# Patient Record
Sex: MALE | Race: OTHER | NOT HISPANIC OR LATINO | URBAN - METROPOLITAN AREA
[De-identification: names, ages, dates, MRNs, and addresses within clinical notes are randomized per-mention and may not be internally consistent; named-entity substitution may affect disease eponyms.]

---

## 2023-10-16 ENCOUNTER — INPATIENT (INPATIENT)
Facility: HOSPITAL | Age: 28
LOS: 3 days | Discharge: LEFT BEFORE DISCHARGE | End: 2023-10-20
Attending: STUDENT IN AN ORGANIZED HEALTH CARE EDUCATION/TRAINING PROGRAM | Admitting: STUDENT IN AN ORGANIZED HEALTH CARE EDUCATION/TRAINING PROGRAM
Payer: MEDICAID

## 2023-10-16 VITALS
TEMPERATURE: 98 F | RESPIRATION RATE: 18 BRPM | SYSTOLIC BLOOD PRESSURE: 146 MMHG | HEART RATE: 136 BPM | DIASTOLIC BLOOD PRESSURE: 79 MMHG | OXYGEN SATURATION: 100 %

## 2023-10-16 DIAGNOSIS — J98.59 OTHER DISEASES OF MEDIASTINUM, NOT ELSEWHERE CLASSIFIED: ICD-10-CM

## 2023-10-16 LAB
ALBUMIN SERPL ELPH-MCNC: 4.3 G/DL — SIGNIFICANT CHANGE UP (ref 3.3–5)
ALBUMIN SERPL ELPH-MCNC: 4.3 G/DL — SIGNIFICANT CHANGE UP (ref 3.3–5)
ALBUMIN SERPL ELPH-MCNC: 4.4 G/DL — SIGNIFICANT CHANGE UP (ref 3.3–5)
ALBUMIN SERPL ELPH-MCNC: 4.4 G/DL — SIGNIFICANT CHANGE UP (ref 3.3–5)
ALP SERPL-CCNC: 155 U/L — HIGH (ref 40–120)
ALP SERPL-CCNC: 155 U/L — HIGH (ref 40–120)
ALP SERPL-CCNC: 175 U/L — HIGH (ref 40–120)
ALP SERPL-CCNC: 175 U/L — HIGH (ref 40–120)
ALT FLD-CCNC: 13 U/L — SIGNIFICANT CHANGE UP (ref 4–41)
ALT FLD-CCNC: 13 U/L — SIGNIFICANT CHANGE UP (ref 4–41)
ALT FLD-CCNC: 17 U/L — SIGNIFICANT CHANGE UP (ref 4–41)
ALT FLD-CCNC: 17 U/L — SIGNIFICANT CHANGE UP (ref 4–41)
ANION GAP SERPL CALC-SCNC: 12 MMOL/L — SIGNIFICANT CHANGE UP (ref 7–14)
AST SERPL-CCNC: 28 U/L — SIGNIFICANT CHANGE UP (ref 4–40)
AST SERPL-CCNC: 28 U/L — SIGNIFICANT CHANGE UP (ref 4–40)
AST SERPL-CCNC: 8 U/L — SIGNIFICANT CHANGE UP (ref 4–40)
AST SERPL-CCNC: 8 U/L — SIGNIFICANT CHANGE UP (ref 4–40)
BASE EXCESS BLDV CALC-SCNC: 1.1 MMOL/L — SIGNIFICANT CHANGE UP (ref -2–3)
BASE EXCESS BLDV CALC-SCNC: 1.1 MMOL/L — SIGNIFICANT CHANGE UP (ref -2–3)
BASOPHILS # BLD AUTO: 0.14 K/UL — SIGNIFICANT CHANGE UP (ref 0–0.2)
BASOPHILS # BLD AUTO: 0.14 K/UL — SIGNIFICANT CHANGE UP (ref 0–0.2)
BASOPHILS NFR BLD AUTO: 0.8 % — SIGNIFICANT CHANGE UP (ref 0–2)
BASOPHILS NFR BLD AUTO: 0.8 % — SIGNIFICANT CHANGE UP (ref 0–2)
BILIRUB SERPL-MCNC: 0.3 MG/DL — SIGNIFICANT CHANGE UP (ref 0.2–1.2)
BLOOD GAS VENOUS COMPREHENSIVE RESULT: SIGNIFICANT CHANGE UP
BLOOD GAS VENOUS COMPREHENSIVE RESULT: SIGNIFICANT CHANGE UP
BUN SERPL-MCNC: 8 MG/DL — SIGNIFICANT CHANGE UP (ref 7–23)
CA-I BLD-SCNC: 1.21 MMOL/L — SIGNIFICANT CHANGE UP (ref 1.15–1.29)
CA-I BLD-SCNC: 1.21 MMOL/L — SIGNIFICANT CHANGE UP (ref 1.15–1.29)
CALCIUM SERPL-MCNC: 10.1 MG/DL — SIGNIFICANT CHANGE UP (ref 8.4–10.5)
CHLORIDE BLDV-SCNC: 102 MMOL/L — SIGNIFICANT CHANGE UP (ref 96–108)
CHLORIDE BLDV-SCNC: 102 MMOL/L — SIGNIFICANT CHANGE UP (ref 96–108)
CHLORIDE SERPL-SCNC: 101 MMOL/L — SIGNIFICANT CHANGE UP (ref 98–107)
CHLORIDE SERPL-SCNC: 101 MMOL/L — SIGNIFICANT CHANGE UP (ref 98–107)
CHLORIDE SERPL-SCNC: 102 MMOL/L — SIGNIFICANT CHANGE UP (ref 98–107)
CHLORIDE SERPL-SCNC: 102 MMOL/L — SIGNIFICANT CHANGE UP (ref 98–107)
CO2 BLDV-SCNC: 28 MMOL/L — HIGH (ref 22–26)
CO2 BLDV-SCNC: 28 MMOL/L — HIGH (ref 22–26)
CO2 SERPL-SCNC: 23 MMOL/L — SIGNIFICANT CHANGE UP (ref 22–31)
CO2 SERPL-SCNC: 23 MMOL/L — SIGNIFICANT CHANGE UP (ref 22–31)
CO2 SERPL-SCNC: 25 MMOL/L — SIGNIFICANT CHANGE UP (ref 22–31)
CO2 SERPL-SCNC: 25 MMOL/L — SIGNIFICANT CHANGE UP (ref 22–31)
CREAT SERPL-MCNC: 0.83 MG/DL — SIGNIFICANT CHANGE UP (ref 0.5–1.3)
CREAT SERPL-MCNC: 0.83 MG/DL — SIGNIFICANT CHANGE UP (ref 0.5–1.3)
CREAT SERPL-MCNC: 0.94 MG/DL — SIGNIFICANT CHANGE UP (ref 0.5–1.3)
CREAT SERPL-MCNC: 0.94 MG/DL — SIGNIFICANT CHANGE UP (ref 0.5–1.3)
D DIMER BLD IA.RAPID-MCNC: 270 NG/ML DDU — HIGH
D DIMER BLD IA.RAPID-MCNC: 270 NG/ML DDU — HIGH
EGFR: 115 ML/MIN/1.73M2 — SIGNIFICANT CHANGE UP
EGFR: 115 ML/MIN/1.73M2 — SIGNIFICANT CHANGE UP
EGFR: 124 ML/MIN/1.73M2 — SIGNIFICANT CHANGE UP
EGFR: 124 ML/MIN/1.73M2 — SIGNIFICANT CHANGE UP
EOSINOPHIL # BLD AUTO: 0.51 K/UL — HIGH (ref 0–0.5)
EOSINOPHIL # BLD AUTO: 0.51 K/UL — HIGH (ref 0–0.5)
EOSINOPHIL NFR BLD AUTO: 2.8 % — SIGNIFICANT CHANGE UP (ref 0–6)
EOSINOPHIL NFR BLD AUTO: 2.8 % — SIGNIFICANT CHANGE UP (ref 0–6)
FIBRINOGEN PPP-MCNC: 505 MG/DL — HIGH (ref 200–465)
FIBRINOGEN PPP-MCNC: 505 MG/DL — HIGH (ref 200–465)
GAS PNL BLDV: 135 MMOL/L — LOW (ref 136–145)
GAS PNL BLDV: 135 MMOL/L — LOW (ref 136–145)
GLUCOSE BLDV-MCNC: 97 MG/DL — SIGNIFICANT CHANGE UP (ref 70–99)
GLUCOSE BLDV-MCNC: 97 MG/DL — SIGNIFICANT CHANGE UP (ref 70–99)
GLUCOSE SERPL-MCNC: 77 MG/DL — SIGNIFICANT CHANGE UP (ref 70–99)
GLUCOSE SERPL-MCNC: 77 MG/DL — SIGNIFICANT CHANGE UP (ref 70–99)
GLUCOSE SERPL-MCNC: 93 MG/DL — SIGNIFICANT CHANGE UP (ref 70–99)
GLUCOSE SERPL-MCNC: 93 MG/DL — SIGNIFICANT CHANGE UP (ref 70–99)
HCG SERPL-ACNC: <1 MIU/ML — SIGNIFICANT CHANGE UP
HCG SERPL-ACNC: <1 MIU/ML — SIGNIFICANT CHANGE UP
HCO3 BLDV-SCNC: 27 MMOL/L — SIGNIFICANT CHANGE UP (ref 22–29)
HCO3 BLDV-SCNC: 27 MMOL/L — SIGNIFICANT CHANGE UP (ref 22–29)
HCT VFR BLD CALC: 41.1 % — SIGNIFICANT CHANGE UP (ref 39–50)
HCT VFR BLD CALC: 41.1 % — SIGNIFICANT CHANGE UP (ref 39–50)
HCT VFR BLDA CALC: 38 % — LOW (ref 39–51)
HCT VFR BLDA CALC: 38 % — LOW (ref 39–51)
HGB BLD CALC-MCNC: 12.8 G/DL — SIGNIFICANT CHANGE UP (ref 12.6–17.4)
HGB BLD CALC-MCNC: 12.8 G/DL — SIGNIFICANT CHANGE UP (ref 12.6–17.4)
HGB BLD-MCNC: 13.6 G/DL — SIGNIFICANT CHANGE UP (ref 13–17)
HGB BLD-MCNC: 13.6 G/DL — SIGNIFICANT CHANGE UP (ref 13–17)
IANC: 14.18 K/UL — HIGH (ref 1.8–7.4)
IANC: 14.18 K/UL — HIGH (ref 1.8–7.4)
IMM GRANULOCYTES NFR BLD AUTO: 0.5 % — SIGNIFICANT CHANGE UP (ref 0–0.9)
IMM GRANULOCYTES NFR BLD AUTO: 0.5 % — SIGNIFICANT CHANGE UP (ref 0–0.9)
INR BLD: 1.1 RATIO — SIGNIFICANT CHANGE UP (ref 0.85–1.18)
INR BLD: 1.1 RATIO — SIGNIFICANT CHANGE UP (ref 0.85–1.18)
LACTATE BLDV-MCNC: 1.1 MMOL/L — SIGNIFICANT CHANGE UP (ref 0.5–2)
LACTATE BLDV-MCNC: 1.1 MMOL/L — SIGNIFICANT CHANGE UP (ref 0.5–2)
LDH SERPL L TO P-CCNC: 119 U/L — LOW (ref 135–225)
LDH SERPL L TO P-CCNC: 119 U/L — LOW (ref 135–225)
LDH SERPL L TO P-CCNC: 505 U/L — HIGH (ref 135–225)
LDH SERPL L TO P-CCNC: 505 U/L — HIGH (ref 135–225)
LYMPHOCYTES # BLD AUTO: 12 % — LOW (ref 13–44)
LYMPHOCYTES # BLD AUTO: 12 % — LOW (ref 13–44)
LYMPHOCYTES # BLD AUTO: 2.21 K/UL — SIGNIFICANT CHANGE UP (ref 1–3.3)
LYMPHOCYTES # BLD AUTO: 2.21 K/UL — SIGNIFICANT CHANGE UP (ref 1–3.3)
MAGNESIUM SERPL-MCNC: 2.2 MG/DL — SIGNIFICANT CHANGE UP (ref 1.6–2.6)
MAGNESIUM SERPL-MCNC: 2.2 MG/DL — SIGNIFICANT CHANGE UP (ref 1.6–2.6)
MAGNESIUM SERPL-MCNC: 2.4 MG/DL — SIGNIFICANT CHANGE UP (ref 1.6–2.6)
MAGNESIUM SERPL-MCNC: 2.4 MG/DL — SIGNIFICANT CHANGE UP (ref 1.6–2.6)
MCHC RBC-ENTMCNC: 26.5 PG — LOW (ref 27–34)
MCHC RBC-ENTMCNC: 26.5 PG — LOW (ref 27–34)
MCHC RBC-ENTMCNC: 33.1 GM/DL — SIGNIFICANT CHANGE UP (ref 32–36)
MCHC RBC-ENTMCNC: 33.1 GM/DL — SIGNIFICANT CHANGE UP (ref 32–36)
MCV RBC AUTO: 80.1 FL — SIGNIFICANT CHANGE UP (ref 80–100)
MCV RBC AUTO: 80.1 FL — SIGNIFICANT CHANGE UP (ref 80–100)
MONOCYTES # BLD AUTO: 1.25 K/UL — HIGH (ref 0–0.9)
MONOCYTES # BLD AUTO: 1.25 K/UL — HIGH (ref 0–0.9)
MONOCYTES NFR BLD AUTO: 6.8 % — SIGNIFICANT CHANGE UP (ref 2–14)
MONOCYTES NFR BLD AUTO: 6.8 % — SIGNIFICANT CHANGE UP (ref 2–14)
NEUTROPHILS # BLD AUTO: 14.18 K/UL — HIGH (ref 1.8–7.4)
NEUTROPHILS # BLD AUTO: 14.18 K/UL — HIGH (ref 1.8–7.4)
NEUTROPHILS NFR BLD AUTO: 77.1 % — HIGH (ref 43–77)
NEUTROPHILS NFR BLD AUTO: 77.1 % — HIGH (ref 43–77)
NRBC # BLD: 0 /100 WBCS — SIGNIFICANT CHANGE UP (ref 0–0)
NRBC # BLD: 0 /100 WBCS — SIGNIFICANT CHANGE UP (ref 0–0)
NRBC # FLD: 0 K/UL — SIGNIFICANT CHANGE UP (ref 0–0)
NRBC # FLD: 0 K/UL — SIGNIFICANT CHANGE UP (ref 0–0)
PCO2 BLDV: 45 MMHG — SIGNIFICANT CHANGE UP (ref 42–55)
PCO2 BLDV: 45 MMHG — SIGNIFICANT CHANGE UP (ref 42–55)
PH BLDV: 7.38 — SIGNIFICANT CHANGE UP (ref 7.32–7.43)
PH BLDV: 7.38 — SIGNIFICANT CHANGE UP (ref 7.32–7.43)
PHOSPHATE SERPL-MCNC: 3.5 MG/DL — SIGNIFICANT CHANGE UP (ref 2.5–4.5)
PHOSPHATE SERPL-MCNC: 3.5 MG/DL — SIGNIFICANT CHANGE UP (ref 2.5–4.5)
PHOSPHATE SERPL-MCNC: 4.4 MG/DL — SIGNIFICANT CHANGE UP (ref 2.5–4.5)
PHOSPHATE SERPL-MCNC: 4.4 MG/DL — SIGNIFICANT CHANGE UP (ref 2.5–4.5)
PLATELET # BLD AUTO: 559 K/UL — HIGH (ref 150–400)
PLATELET # BLD AUTO: 559 K/UL — HIGH (ref 150–400)
PO2 BLDV: 53 MMHG — HIGH (ref 25–45)
PO2 BLDV: 53 MMHG — HIGH (ref 25–45)
POTASSIUM BLDV-SCNC: 3.6 MMOL/L — SIGNIFICANT CHANGE UP (ref 3.5–5.1)
POTASSIUM BLDV-SCNC: 3.6 MMOL/L — SIGNIFICANT CHANGE UP (ref 3.5–5.1)
POTASSIUM SERPL-MCNC: 3.5 MMOL/L — SIGNIFICANT CHANGE UP (ref 3.5–5.3)
POTASSIUM SERPL-MCNC: 3.5 MMOL/L — SIGNIFICANT CHANGE UP (ref 3.5–5.3)
POTASSIUM SERPL-MCNC: 4.7 MMOL/L — SIGNIFICANT CHANGE UP (ref 3.5–5.3)
POTASSIUM SERPL-MCNC: 4.7 MMOL/L — SIGNIFICANT CHANGE UP (ref 3.5–5.3)
POTASSIUM SERPL-SCNC: 3.5 MMOL/L — SIGNIFICANT CHANGE UP (ref 3.5–5.3)
POTASSIUM SERPL-SCNC: 3.5 MMOL/L — SIGNIFICANT CHANGE UP (ref 3.5–5.3)
POTASSIUM SERPL-SCNC: 4.7 MMOL/L — SIGNIFICANT CHANGE UP (ref 3.5–5.3)
POTASSIUM SERPL-SCNC: 4.7 MMOL/L — SIGNIFICANT CHANGE UP (ref 3.5–5.3)
PROT SERPL-MCNC: 8.3 G/DL — SIGNIFICANT CHANGE UP (ref 6–8.3)
PROT SERPL-MCNC: 8.3 G/DL — SIGNIFICANT CHANGE UP (ref 6–8.3)
PROT SERPL-MCNC: 9.2 G/DL — HIGH (ref 6–8.3)
PROT SERPL-MCNC: 9.2 G/DL — HIGH (ref 6–8.3)
PROTHROM AB SERPL-ACNC: 12.3 SEC — SIGNIFICANT CHANGE UP (ref 9.5–13)
PROTHROM AB SERPL-ACNC: 12.3 SEC — SIGNIFICANT CHANGE UP (ref 9.5–13)
RBC # BLD: 5.13 M/UL — SIGNIFICANT CHANGE UP (ref 4.2–5.8)
RBC # BLD: 5.13 M/UL — SIGNIFICANT CHANGE UP (ref 4.2–5.8)
RBC # FLD: 14 % — SIGNIFICANT CHANGE UP (ref 10.3–14.5)
RBC # FLD: 14 % — SIGNIFICANT CHANGE UP (ref 10.3–14.5)
SAO2 % BLDV: 83.7 % — SIGNIFICANT CHANGE UP (ref 67–88)
SAO2 % BLDV: 83.7 % — SIGNIFICANT CHANGE UP (ref 67–88)
SODIUM SERPL-SCNC: 137 MMOL/L — SIGNIFICANT CHANGE UP (ref 135–145)
SODIUM SERPL-SCNC: 137 MMOL/L — SIGNIFICANT CHANGE UP (ref 135–145)
SODIUM SERPL-SCNC: 138 MMOL/L — SIGNIFICANT CHANGE UP (ref 135–145)
SODIUM SERPL-SCNC: 138 MMOL/L — SIGNIFICANT CHANGE UP (ref 135–145)
TSH SERPL-MCNC: 0.93 UIU/ML — SIGNIFICANT CHANGE UP (ref 0.27–4.2)
TSH SERPL-MCNC: 0.93 UIU/ML — SIGNIFICANT CHANGE UP (ref 0.27–4.2)
URATE SERPL-MCNC: 8.9 MG/DL — HIGH (ref 3.4–8.8)
URATE SERPL-MCNC: 8.9 MG/DL — HIGH (ref 3.4–8.8)
URATE SERPL-MCNC: 9 MG/DL — HIGH (ref 3.4–8.8)
URATE SERPL-MCNC: 9 MG/DL — HIGH (ref 3.4–8.8)
WBC # BLD: 18.38 K/UL — HIGH (ref 3.8–10.5)
WBC # BLD: 18.38 K/UL — HIGH (ref 3.8–10.5)
WBC # FLD AUTO: 18.38 K/UL — HIGH (ref 3.8–10.5)
WBC # FLD AUTO: 18.38 K/UL — HIGH (ref 3.8–10.5)

## 2023-10-16 PROCEDURE — 74177 CT ABD & PELVIS W/CONTRAST: CPT | Mod: 26,MA

## 2023-10-16 PROCEDURE — 88189 FLOWCYTOMETRY/READ 16 & >: CPT

## 2023-10-16 PROCEDURE — 71275 CT ANGIOGRAPHY CHEST: CPT | Mod: 26,MA

## 2023-10-16 PROCEDURE — 99285 EMERGENCY DEPT VISIT HI MDM: CPT

## 2023-10-16 RX ORDER — ALLOPURINOL 300 MG
300 TABLET ORAL DAILY
Refills: 0 | Status: DISCONTINUED | OUTPATIENT
Start: 2023-10-16 | End: 2023-10-20

## 2023-10-16 RX ORDER — SODIUM CHLORIDE 9 MG/ML
1000 INJECTION INTRAMUSCULAR; INTRAVENOUS; SUBCUTANEOUS ONCE
Refills: 0 | Status: COMPLETED | OUTPATIENT
Start: 2023-10-16 | End: 2023-10-16

## 2023-10-16 RX ORDER — SODIUM CHLORIDE 9 MG/ML
1000 INJECTION, SOLUTION INTRAVENOUS
Refills: 0 | Status: DISCONTINUED | OUTPATIENT
Start: 2023-10-16 | End: 2023-10-17

## 2023-10-16 RX ADMIN — SODIUM CHLORIDE 1000 MILLILITER(S): 9 INJECTION INTRAMUSCULAR; INTRAVENOUS; SUBCUTANEOUS at 23:57

## 2023-10-16 NOTE — ED ADULT TRIAGE NOTE - CHIEF COMPLAINT QUOTE
patient found chest mass in sept, sent for biopsy. Pt unsure of results but states "md looking for lymphoma". Pt was scheduled for PETSCAN in 2 weeks but is having chest pain and was told to come to ED. Pt very hestitant in triage, poor historian. States " feels overwhelmed with news.

## 2023-10-16 NOTE — ED PROVIDER NOTE - PROGRESS NOTE DETAILS
ALLY BLACKWOOD: Onc consulted. CT w/ bulky mediastinal lymphadenopathy, consistent with neoplasm. Pt accepted by hospitalist.

## 2023-10-16 NOTE — ED PROVIDER NOTE - NS ED ATTENDING STATEMENT MOD
This was a shared visit with the BILLIE. I reviewed and verified the documentation and independently performed the documented:

## 2023-10-16 NOTE — ED PROVIDER NOTE - CLINICAL SUMMARY MEDICAL DECISION MAKING FREE TEXT BOX
27 yo M with no significant PMH, recent finding of mediastinal mass w/ biopsy 10/4/23 at [facility] w/ inconclusive result, sent by PCP to ED c/o chest pain x3 weeks, mediastinal mass re-biopsy. well appearing male. EKG w/ sinus tach, currently no chest pain or sob, not in acute respiratory distress. Pt recently had workup for mediastinal mass w/ labs and CT chest/abd/pelvis, chest mass biopsy 10/4 w/ inconclusive result, pending another biopsy this Thursday, pending PET scan in 2 wks. Plan: labs, CT chest/abd/pelvis, oncology consult.

## 2023-10-16 NOTE — ED PROVIDER NOTE - OBJECTIVE STATEMENT
27 yo M with no significant PMH, recent finding of mediastinal mass w/ biopsy 10/4/23 at [facility] w/ inconclusive result, sent by PCP to ED c/o chest pain x3 weeks, mediastinal mass re-biopsy. 27 yo M with no significant PMH, recent finding of mediastinal mass w/ biopsy 10/4/23 at [facility] w/ inconclusive result, sent by PCP to ED c/o chest pain x3 weeks, mediastinal mass re-biopsy. States having left side sharp pain, intermittent, lasting for seconds, worse with coughing and movements. Reports seen and evaluated at [facility] w/ CT scan and had biopsy which need more sample. Admits PCP felt the place is taking too long, therefore, advised to go [facility] for evaluation. Pt denies any associated symptoms, including f/c/n/v, near syncope, palpitation, diaphoresis, cough, sob, weakness, any recent trauma or injury to chest, lower extremity edema. Denies any prior history of DVT/PE, hx of malignancy or known hypercoagulable state. Denies any early family history of cardiac death or MI.

## 2023-10-16 NOTE — ED PROVIDER NOTE - ATTENDING APP SHARED VISIT CONTRIBUTION OF CARE
Patient well-appearing no acute distress HEENT normal no cervical adenopathy no jaundice, heart sounds normal lungs clear no chest wall tenderness.  Abdomen soft nontender no hepatosplenomegaly.  Extremities no edema skin no rashes.  Reviewed old biopsy results  from patients phone done at [facility], and laboratory data significant for elevated white count of over 20,000 elevated alk phos but normal LDH.

## 2023-10-16 NOTE — ED ADULT NURSE NOTE - OBJECTIVE STATEMENT
pt received intake #10c, sent by his PMD for a pet scan and oncology follow up. pt had Biopsy at [facility] that was concerning for possible cancer. c/o shoulder pain x 2 weeks. blood work obtained and sent. pt aox4, rr even and unlabored. family at bedside. pending CT scan. pt to RW.

## 2023-10-16 NOTE — CHART NOTE - NSCHARTNOTEFT_GEN_A_CORE
Hematology called for patient Felipe Gaitan. He had the following labs at [facility] (10/4/23): WBC 26.7, Hgb 12.7, MCV 83, Total Protein 9.4, ALK Phos 192. Patient had a mediastinal lymph node biopsy that showed a few large atypical cells, many foamy cells, no Tonio-Daniel cells. More tissue is needed for further characterization.     Please obtain:  - CBC w/ diff daily, TLS labs (uric acid, LDH, Phos, CMP)  - CT c/a/p w/ IV contrast  - If there is a lymph node that can be biopsied, recommend core biopsy with IR or EBUS depending on location of lymph node  - Will send peripheral flow cytometry  - Will review peripheral smear  - Infectious work up per primary team and ID  - Workup for other types of mediastinal masses: AFB, HCG, TSH  - Hematology will perform a full consult in the morning    ***************************************************************  Linda Lorenzana, PGY4  Fellow Hematology/Oncology  pager: [phone]   Available on Microsoft Teams  After 5pm or on weekends please contact  to page on-call fellow   *************************************************************** Hematology called for patient Felipe Gaitan. He had the following labs at [facility] (10/4/23): WBC 26.7, Hgb 12.7, MCV 83, Total Protein 9.4, ALK Phos 192. Patient had a mediastinal lymph node biopsy that showed a few large atypical cells, many foamy cells, no Tonio-Daniel cells. More tissue is needed for further characterization.     Please obtain:  - CBC w/ diff daily, TLS labs (uric acid, LDH, Phos, CMP)  - Continue supportive transfusions as needed to maintain Hgb > 7 and plt > 10.   - Trend DIC labs (PT, INR, PTT, D-dimer) daily  - Trend TLS labs (CMP, Phos, uric acid, LDH) daily  - Continue IV hydration at 100 cc/hour  - Start allopurinol 300 mg daily.  Consider rasburicase if uric acid > 8. Please check G6PD level prior to giving rasburicase  - CT c/a/p w/ IV contrast  - If there is a lymph node that can be biopsied, recommend core biopsy with IR or EBUS depending on location of lymph node  - Will send peripheral flow cytometry  - Will review peripheral smear  - Infectious work up per primary team and ID  - Workup for other types of mediastinal masses: AFB, HCG, TSH  - Hematology will perform a full consult in the morning    ***************************************************************  Linda Lorenzana, PGY4  Fellow Hematology/Oncology  pager: [phone]   Available on Microsoft Teams  After 5pm or on weekends please contact  to page on-call fellow   ***************************************************************

## 2023-10-17 DIAGNOSIS — R01.1 CARDIAC MURMUR, UNSPECIFIED: ICD-10-CM

## 2023-10-17 DIAGNOSIS — D72.829 ELEVATED WHITE BLOOD CELL COUNT, UNSPECIFIED: ICD-10-CM

## 2023-10-17 DIAGNOSIS — R59.0 LOCALIZED ENLARGED LYMPH NODES: ICD-10-CM

## 2023-10-17 DIAGNOSIS — E88.3 TUMOR LYSIS SYNDROME: ICD-10-CM

## 2023-10-17 DIAGNOSIS — Z29.9 ENCOUNTER FOR PROPHYLACTIC MEASURES, UNSPECIFIED: ICD-10-CM

## 2023-10-17 LAB
AFP-TM SERPL-MCNC: <1.8 NG/ML — SIGNIFICANT CHANGE UP
AFP-TM SERPL-MCNC: <1.8 NG/ML — SIGNIFICANT CHANGE UP
ALBUMIN SERPL ELPH-MCNC: 3.9 G/DL — SIGNIFICANT CHANGE UP (ref 3.3–5)
ALBUMIN SERPL ELPH-MCNC: 3.9 G/DL — SIGNIFICANT CHANGE UP (ref 3.3–5)
ALP SERPL-CCNC: 143 U/L — HIGH (ref 40–120)
ALP SERPL-CCNC: 143 U/L — HIGH (ref 40–120)
ALT FLD-CCNC: 12 U/L — SIGNIFICANT CHANGE UP (ref 4–41)
ALT FLD-CCNC: 12 U/L — SIGNIFICANT CHANGE UP (ref 4–41)
ANION GAP SERPL CALC-SCNC: 12 MMOL/L — SIGNIFICANT CHANGE UP (ref 7–14)
ANION GAP SERPL CALC-SCNC: 12 MMOL/L — SIGNIFICANT CHANGE UP (ref 7–14)
APTT BLD: 32.1 SEC — SIGNIFICANT CHANGE UP (ref 24.5–35.6)
APTT BLD: 32.1 SEC — SIGNIFICANT CHANGE UP (ref 24.5–35.6)
AST SERPL-CCNC: 7 U/L — SIGNIFICANT CHANGE UP (ref 4–40)
AST SERPL-CCNC: 7 U/L — SIGNIFICANT CHANGE UP (ref 4–40)
BASOPHILS # BLD AUTO: 0.11 K/UL — SIGNIFICANT CHANGE UP (ref 0–0.2)
BASOPHILS # BLD AUTO: 0.11 K/UL — SIGNIFICANT CHANGE UP (ref 0–0.2)
BASOPHILS NFR BLD AUTO: 0.6 % — SIGNIFICANT CHANGE UP (ref 0–2)
BASOPHILS NFR BLD AUTO: 0.6 % — SIGNIFICANT CHANGE UP (ref 0–2)
BILIRUB SERPL-MCNC: 0.5 MG/DL — SIGNIFICANT CHANGE UP (ref 0.2–1.2)
BILIRUB SERPL-MCNC: 0.5 MG/DL — SIGNIFICANT CHANGE UP (ref 0.2–1.2)
BUN SERPL-MCNC: 7 MG/DL — SIGNIFICANT CHANGE UP (ref 7–23)
BUN SERPL-MCNC: 7 MG/DL — SIGNIFICANT CHANGE UP (ref 7–23)
CALCIUM SERPL-MCNC: 9.3 MG/DL — SIGNIFICANT CHANGE UP (ref 8.4–10.5)
CALCIUM SERPL-MCNC: 9.3 MG/DL — SIGNIFICANT CHANGE UP (ref 8.4–10.5)
CHLORIDE SERPL-SCNC: 105 MMOL/L — SIGNIFICANT CHANGE UP (ref 98–107)
CHLORIDE SERPL-SCNC: 105 MMOL/L — SIGNIFICANT CHANGE UP (ref 98–107)
CO2 SERPL-SCNC: 23 MMOL/L — SIGNIFICANT CHANGE UP (ref 22–31)
CO2 SERPL-SCNC: 23 MMOL/L — SIGNIFICANT CHANGE UP (ref 22–31)
CREAT SERPL-MCNC: 0.87 MG/DL — SIGNIFICANT CHANGE UP (ref 0.5–1.3)
CREAT SERPL-MCNC: 0.87 MG/DL — SIGNIFICANT CHANGE UP (ref 0.5–1.3)
D DIMER BLD IA.RAPID-MCNC: 296 NG/ML DDU — HIGH
D DIMER BLD IA.RAPID-MCNC: 296 NG/ML DDU — HIGH
EGFR: 122 ML/MIN/1.73M2 — SIGNIFICANT CHANGE UP
EGFR: 122 ML/MIN/1.73M2 — SIGNIFICANT CHANGE UP
EOSINOPHIL # BLD AUTO: 0.59 K/UL — HIGH (ref 0–0.5)
EOSINOPHIL # BLD AUTO: 0.59 K/UL — HIGH (ref 0–0.5)
EOSINOPHIL NFR BLD AUTO: 3.2 % — SIGNIFICANT CHANGE UP (ref 0–6)
EOSINOPHIL NFR BLD AUTO: 3.2 % — SIGNIFICANT CHANGE UP (ref 0–6)
GLUCOSE SERPL-MCNC: 100 MG/DL — HIGH (ref 70–99)
GLUCOSE SERPL-MCNC: 100 MG/DL — HIGH (ref 70–99)
HCG-TM SERPL-ACNC: <1 MIU/ML — SIGNIFICANT CHANGE UP
HCG-TM SERPL-ACNC: <1 MIU/ML — SIGNIFICANT CHANGE UP
HCT VFR BLD CALC: 36.5 % — LOW (ref 39–50)
HCT VFR BLD CALC: 36.5 % — LOW (ref 39–50)
HGB BLD-MCNC: 11.7 G/DL — LOW (ref 13–17)
HGB BLD-MCNC: 11.7 G/DL — LOW (ref 13–17)
IANC: 14.3 K/UL — HIGH (ref 1.8–7.4)
IANC: 14.3 K/UL — HIGH (ref 1.8–7.4)
IMM GRANULOCYTES NFR BLD AUTO: 0.5 % — SIGNIFICANT CHANGE UP (ref 0–0.9)
IMM GRANULOCYTES NFR BLD AUTO: 0.5 % — SIGNIFICANT CHANGE UP (ref 0–0.9)
INR BLD: 1.15 RATIO — SIGNIFICANT CHANGE UP (ref 0.85–1.18)
INR BLD: 1.15 RATIO — SIGNIFICANT CHANGE UP (ref 0.85–1.18)
LDH SERPL L TO P-CCNC: 111 U/L — LOW (ref 135–225)
LDH SERPL L TO P-CCNC: 111 U/L — LOW (ref 135–225)
LYMPHOCYTES # BLD AUTO: 11.5 % — LOW (ref 13–44)
LYMPHOCYTES # BLD AUTO: 11.5 % — LOW (ref 13–44)
LYMPHOCYTES # BLD AUTO: 2.1 K/UL — SIGNIFICANT CHANGE UP (ref 1–3.3)
LYMPHOCYTES # BLD AUTO: 2.1 K/UL — SIGNIFICANT CHANGE UP (ref 1–3.3)
MAGNESIUM SERPL-MCNC: 2.2 MG/DL — SIGNIFICANT CHANGE UP (ref 1.6–2.6)
MAGNESIUM SERPL-MCNC: 2.2 MG/DL — SIGNIFICANT CHANGE UP (ref 1.6–2.6)
MCHC RBC-ENTMCNC: 26.3 PG — LOW (ref 27–34)
MCHC RBC-ENTMCNC: 26.3 PG — LOW (ref 27–34)
MCHC RBC-ENTMCNC: 32.1 GM/DL — SIGNIFICANT CHANGE UP (ref 32–36)
MCHC RBC-ENTMCNC: 32.1 GM/DL — SIGNIFICANT CHANGE UP (ref 32–36)
MCV RBC AUTO: 82 FL — SIGNIFICANT CHANGE UP (ref 80–100)
MCV RBC AUTO: 82 FL — SIGNIFICANT CHANGE UP (ref 80–100)
MONOCYTES # BLD AUTO: 1 K/UL — HIGH (ref 0–0.9)
MONOCYTES # BLD AUTO: 1 K/UL — HIGH (ref 0–0.9)
MONOCYTES NFR BLD AUTO: 5.5 % — SIGNIFICANT CHANGE UP (ref 2–14)
MONOCYTES NFR BLD AUTO: 5.5 % — SIGNIFICANT CHANGE UP (ref 2–14)
NEUTROPHILS # BLD AUTO: 14.3 K/UL — HIGH (ref 1.8–7.4)
NEUTROPHILS # BLD AUTO: 14.3 K/UL — HIGH (ref 1.8–7.4)
NEUTROPHILS NFR BLD AUTO: 78.7 % — HIGH (ref 43–77)
NEUTROPHILS NFR BLD AUTO: 78.7 % — HIGH (ref 43–77)
NRBC # BLD: 0 /100 WBCS — SIGNIFICANT CHANGE UP (ref 0–0)
NRBC # BLD: 0 /100 WBCS — SIGNIFICANT CHANGE UP (ref 0–0)
NRBC # FLD: 0 K/UL — SIGNIFICANT CHANGE UP (ref 0–0)
NRBC # FLD: 0 K/UL — SIGNIFICANT CHANGE UP (ref 0–0)
PHOSPHATE SERPL-MCNC: 4.2 MG/DL — SIGNIFICANT CHANGE UP (ref 2.5–4.5)
PHOSPHATE SERPL-MCNC: 4.2 MG/DL — SIGNIFICANT CHANGE UP (ref 2.5–4.5)
PLATELET # BLD AUTO: 417 K/UL — HIGH (ref 150–400)
PLATELET # BLD AUTO: 417 K/UL — HIGH (ref 150–400)
POTASSIUM SERPL-MCNC: 3.8 MMOL/L — SIGNIFICANT CHANGE UP (ref 3.5–5.3)
POTASSIUM SERPL-MCNC: 3.8 MMOL/L — SIGNIFICANT CHANGE UP (ref 3.5–5.3)
POTASSIUM SERPL-SCNC: 3.8 MMOL/L — SIGNIFICANT CHANGE UP (ref 3.5–5.3)
POTASSIUM SERPL-SCNC: 3.8 MMOL/L — SIGNIFICANT CHANGE UP (ref 3.5–5.3)
PROT SERPL-MCNC: 8.2 G/DL — SIGNIFICANT CHANGE UP (ref 6–8.3)
PROT SERPL-MCNC: 8.2 G/DL — SIGNIFICANT CHANGE UP (ref 6–8.3)
PROTHROM AB SERPL-ACNC: 12.9 SEC — SIGNIFICANT CHANGE UP (ref 9.5–13)
PROTHROM AB SERPL-ACNC: 12.9 SEC — SIGNIFICANT CHANGE UP (ref 9.5–13)
RBC # BLD: 4.45 M/UL — SIGNIFICANT CHANGE UP (ref 4.2–5.8)
RBC # BLD: 4.45 M/UL — SIGNIFICANT CHANGE UP (ref 4.2–5.8)
RBC # FLD: 14.4 % — SIGNIFICANT CHANGE UP (ref 10.3–14.5)
RBC # FLD: 14.4 % — SIGNIFICANT CHANGE UP (ref 10.3–14.5)
SODIUM SERPL-SCNC: 140 MMOL/L — SIGNIFICANT CHANGE UP (ref 135–145)
SODIUM SERPL-SCNC: 140 MMOL/L — SIGNIFICANT CHANGE UP (ref 135–145)
TSH SERPL-MCNC: 0.68 UIU/ML — SIGNIFICANT CHANGE UP (ref 0.27–4.2)
TSH SERPL-MCNC: 0.68 UIU/ML — SIGNIFICANT CHANGE UP (ref 0.27–4.2)
URATE SERPL-MCNC: 8.6 MG/DL — SIGNIFICANT CHANGE UP (ref 3.4–8.8)
URATE SERPL-MCNC: 8.6 MG/DL — SIGNIFICANT CHANGE UP (ref 3.4–8.8)
WBC # BLD: 18.2 K/UL — HIGH (ref 3.8–10.5)
WBC # BLD: 18.2 K/UL — HIGH (ref 3.8–10.5)
WBC # FLD AUTO: 18.2 K/UL — HIGH (ref 3.8–10.5)
WBC # FLD AUTO: 18.2 K/UL — HIGH (ref 3.8–10.5)

## 2023-10-17 PROCEDURE — 99223 1ST HOSP IP/OBS HIGH 75: CPT

## 2023-10-17 PROCEDURE — 99222 1ST HOSP IP/OBS MODERATE 55: CPT | Mod: GC

## 2023-10-17 PROCEDURE — 99222 1ST HOSP IP/OBS MODERATE 55: CPT

## 2023-10-17 RX ORDER — ENOXAPARIN SODIUM 100 MG/ML
40 INJECTION SUBCUTANEOUS EVERY 24 HOURS
Refills: 0 | Status: DISCONTINUED | OUTPATIENT
Start: 2023-10-17 | End: 2023-10-17

## 2023-10-17 RX ORDER — ACETAMINOPHEN 500 MG
650 TABLET ORAL EVERY 6 HOURS
Refills: 0 | Status: DISCONTINUED | OUTPATIENT
Start: 2023-10-17 | End: 2023-10-20

## 2023-10-17 RX ORDER — SODIUM CHLORIDE 9 MG/ML
1000 INJECTION, SOLUTION INTRAVENOUS
Refills: 0 | Status: DISCONTINUED | OUTPATIENT
Start: 2023-10-17 | End: 2023-10-20

## 2023-10-17 RX ORDER — LANOLIN ALCOHOL/MO/W.PET/CERES
3 CREAM (GRAM) TOPICAL AT BEDTIME
Refills: 0 | Status: DISCONTINUED | OUTPATIENT
Start: 2023-10-17 | End: 2023-10-20

## 2023-10-17 RX ORDER — ALLOPURINOL 300 MG
300 TABLET ORAL DAILY
Refills: 0 | Status: DISCONTINUED | OUTPATIENT
Start: 2023-10-17 | End: 2023-10-17

## 2023-10-17 RX ADMIN — Medication 300 MILLIGRAM(S): at 12:17

## 2023-10-17 RX ADMIN — SODIUM CHLORIDE 100 MILLILITER(S): 9 INJECTION, SOLUTION INTRAVENOUS at 01:38

## 2023-10-17 RX ADMIN — SODIUM CHLORIDE 100 MILLILITER(S): 9 INJECTION, SOLUTION INTRAVENOUS at 12:16

## 2023-10-17 RX ADMIN — SODIUM CHLORIDE 100 MILLILITER(S): 9 INJECTION, SOLUTION INTRAVENOUS at 21:46

## 2023-10-17 NOTE — H&P ADULT - NSHPPHYSICALEXAM_GEN_ALL_CORE
Vital Signs Last 24 Hrs  T(C): 36.7 (17 Oct 2023 00:41), Max: 37.2 (16 Oct 2023 18:14)  T(F): 98 (17 Oct 2023 00:41), Max: 99 (16 Oct 2023 18:14)  HR: 90 (17 Oct 2023 00:41) (90 - 136)  BP: 122/73 (17 Oct 2023 00:41) (118/87 - 146/79)  BP(mean): --  RR: 18 (17 Oct 2023 00:41) (18 - 18)  SpO2: 100% (17 Oct 2023 00:41) (100% - 100%)    Parameters below as of 17 Oct 2023 00:41  Patient On (Oxygen Delivery Method): room air        CONSTITUTIONAL: Well-groomed, in no apparent distress  EYES: No conjunctival or scleral injection, non-icteric; PERRLA and symmetric  ENMT: No external nasal lesions; nasal mucosa not inflamed; normal dentition;  oral mucosa with moist membranes  NECK: Trachea midline without palpable neck mass; thyroid not enlarged and non-tender  RESPIRATORY: Breathing comfortably; lungs CTA without wheeze/rhonchi/rales  CARDIOVASCULAR: +S1S2, RRR, no M/G/R; pedal pulses full and symmetric; no lower extremity edema bilaterally  GASTROINTESTINAL: No palpable masses or tenderness, +BS throughout, no rebound/guarding; no hepatosplenomegaly; no hernia palpated  MUSCULOSKELETAL:  no digital clubbing or cyanosis; no paraspinal tenderness; examination of the  (head/neck, spine/ribs/pelvis, RUE, LUE, RLE, LLE) without misalignment, normal strength and tone of extremities  SKIN: No rashes or ulcers noted; no subcutaneous nodules or induration palpable  NEUROLOGIC: CN II-XII intact; sensation intact in LEs b/l to light touch  PSYCHIATRIC: A+O x 3; mood and affect appropriate; appropriate insight and judgment

## 2023-10-17 NOTE — H&P ADULT - NSHPSOCIALHISTORY_GEN_ALL_CORE
Does huka once a month, denies smoking cigarettes and occasionally drinks alcohol. Lives at home with mother. Works in logistics.

## 2023-10-17 NOTE — H&P ADULT - PROBLEM SELECTOR PLAN 2
-Has had elevated WBC since presentation   -blood culture x2 ordered  -no infectious symptoms currently-of diarrhea, nausea or vomiting, or SOB/cough   -continue to monitor fever/WBC count   -consider ID based on fever/WBC curve.

## 2023-10-17 NOTE — CONSULT NOTE ADULT - SUBJECTIVE AND OBJECTIVE BOX
CHIEF COMPLAINT:Patient is a 28y old  Male who presents with a chief complaint of workup of lymphoma (17 Oct 2023 15:24)      HPI:  Mr. Gaitan is a 28 year old M w/ recent findings of mediastinal mass with biopsy who presents for workup given recent inconclusive biopsy results. He was initially sent by his PCP to ED for chest pain 3 weeks ago, when patient was admitted at [facility] for workup. He describes a left sided sharp chest pain that is intermittent that lasts a few seconds which radiates to the left shoulder and is worse with coughing or movements. At [facility], bulky mediastinal lymphadenopathy was seen and biopsied. Preliminary reports from the mediastinal lymph node biopsy that showed a few large atypical cells, many foamy cells, no Tonio-Daniel cells with a request for more tissue is needed for further characterization. Lab work from [facility] showed the following (10/4/23): WBC 26.7, Hgb 12.7, MCV 83, Total Protein 9.4, ALK Phos 192. CT abdomen and pelvis w/ IV contrast showed bulky mediastinal LAD with a dominant anterior mediastinal conglomerate measuring 6.6x6.2 cm and a right paratracheal lymph node measuring 3.4 cm x2.6 cm. Labs at [facility] were sig for the following: WBC 18.38, , neutrophil 14.18, and neutrophil 77%, lymph 12%, fibrinogen 50, d-dimer 270, alk phos 155, ->505, uric acid 9->8.9, H/H 13.6/41.1, MCV 80 and LA 1.1. CTA chest showed no pulmonary embolism. EKG showed sinus tach, , no st/t changes, qtc of 438 ms. He denies any other symptoms of abdominal pain, change in bowel habits, nausea, vomiting, rashes, itching, fever, or chills. He reports no family history of any lymphoma, leukemia, or any other cancer in the family.  (17 Oct 2023 04:05)  Transthoracic bx performed at [facility], nondiagnostic. Pulm consulted for assistance with diagnosis.       PAST MEDICAL & SURGICAL HISTORY:  No pertinent past medical history      No significant past surgical history          FAMILY HISTORY:  No pertinent family history in first degree relatives        SOCIAL HISTORY:  Smoking: occasional hookah    Allergies    No Known Allergies    Intolerances        HOME MEDICATIONS:  Home Medications:      REVIEW OF SYSTEMS:  Constitutional: [x ] negative [ ] fevers [ ] chills [ ] weight loss [ ] weight gain  HEENT: [ x] negative [ ] dry eyes [ ] eye irritation [ ] postnasal drip [ ] nasal congestion  CV: [ ] negative  [ x] chest pain [ ] orthopnea [ ] palpitations [ ] murmur  Resp: [x ] negative [ ] cough [ ] shortness of breath [ ] dyspnea [ ] wheezing [ ] sputum [ ] hemoptysis  GI: [ x] negative [ ] nausea [ ] vomiting [ ] diarrhea [ ] constipation [ ] abd pain [ ] dysphagia   : [x ] negative [ ] dysuria [ ] nocturia [ ] hematuria [ ] increased urinary frequency  Musculoskeletal: [ ] negative [ ] back pain [ ] myalgias [ ] arthralgias [ ] fracture  Skin: [ ] negative [ ] rash [ ] itch  Neurological: [ ] negative [ ] headache [ ] dizziness [ ] syncope [ ] weakness [ ] numbness  Psychiatric: [ ] negative [ ] anxiety [ ] depression  Endocrine: [ ] negative [ ] diabetes [ ] thyroid problem  Hematologic/Lymphatic: [ ] negative [ ] anemia [ ] bleeding problem  Allergic/Immunologic: [ ] negative [ ] itchy eyes [ ] nasal discharge [ ] hives [ ] angioedema  [x ] All other systems negative  [ ] Unable to assess ROS because ________    OBJECTIVE:  ICU Vital Signs Last 24 Hrs  T(C): 36.7 (17 Oct 2023 05:33), Max: 37.2 (16 Oct 2023 18:14)  T(F): 98.1 (17 Oct 2023 05:33), Max: 99 (16 Oct 2023 18:14)  HR: 85 (17 Oct 2023 05:33) (85 - 101)  BP: 121/74 (17 Oct 2023 05:33) (118/87 - 122/73)  BP(mean): --  ABP: --  ABP(mean): --  RR: 16 (17 Oct 2023 05:33) (16 - 18)  SpO2: 100% (17 Oct 2023 05:33) (100% - 100%)    O2 Parameters below as of 17 Oct 2023 05:33  Patient On (Oxygen Delivery Method): room air              CAPILLARY BLOOD GLUCOSE          PHYSICAL EXAM:  Gen: NAD, WD, WN  HEENT: MMM, PERRL, EOMI  Neck: No JVP elevation  CV: regular  Lung: CTAB  Abd: Soft, NT, ND  Ext: WWP, no CCE  Skin: No rash  Neuro: A&Ox3, no focal deficits    HOSPITAL MEDICATIONS:  Standing Meds:  allopurinol 300 milliGRAM(s) Oral daily  lactated ringers. 1000 milliLiter(s) IV Continuous <Continuous>      PRN Meds:  acetaminophen     Tablet .. 650 milliGRAM(s) Oral every 6 hours PRN  melatonin 3 milliGRAM(s) Oral at bedtime PRN      LABS:    The Labs were reviewed by me   The Radiology was reviewed by me    EKG tracing reviewed by me    10-17    140  |  105  |  7   ----------------------------<  100<H>  3.8   |  23  |  0.87  10-16    138  |  101  |  8   ----------------------------<  93  3.5   |  25  |  0.94  10-16    137  |  102  |  8   ----------------------------<  77  4.7   |  23  |  0.83    Ca    9.3      17 Oct 2023 04:55  Ca    10.1      16 Oct 2023 22:04  Ca    10.1      16 Oct 2023 16:34  Phos  4.2     10-17  Mg     2.20     10-17    TPro  8.2  /  Alb  3.9  /  TBili  0.5  /  DBili  x   /  AST  7   /  ALT  12  /  AlkPhos  143<H>  10-17  TPro  8.3  /  Alb  4.3  /  TBili  0.3  /  DBili  x   /  AST  8   /  ALT  13  /  AlkPhos  155<H>  10-16  TPro  9.2<H>  /  Alb  4.4  /  TBili  0.3  /  DBili  x   /  AST  28  /  ALT  17  /  AlkPhos  175<H>  10-16    Magnesium: 2.20 mg/dL (10-17-23 @ 04:55)  Magnesium: 2.20 mg/dL (10-16-23 @ 22:04)  Magnesium: 2.40 mg/dL (10-16-23 @ 16:34)    Phosphorus: 4.2 mg/dL (10-17-23 @ 04:55)  Phosphorus: 4.4 mg/dL (10-16-23 @ 22:04)  Phosphorus: 3.5 mg/dL (10-16-23 @ 16:34)      PT/INR - ( 17 Oct 2023 04:55 )   PT: 12.9 sec;   INR: 1.15 ratio         PTT - ( 17 Oct 2023 04:55 )  PTT:32.1 sec              Urinalysis Basic - ( 17 Oct 2023 04:55 )    Color: x / Appearance: x / SG: x / pH: x  Gluc: 100 mg/dL / Ketone: x  / Bili: x / Urobili: x   Blood: x / Protein: x / Nitrite: x   Leuk Esterase: x / RBC: x / WBC x   Sq Epi: x / Non Sq Epi: x / Bacteria: x                              11.7   18.20 )-----------( 417      ( 17 Oct 2023 04:55 )             36.5                         13.6   18.38 )-----------( 559      ( 16 Oct 2023 16:34 )             41.1     CAPILLARY BLOOD GLUCOSE            MICROBIOLOGY:       RADIOLOGY:  [ ] Reviewed and interpreted by me    PULMONARY FUNCTION TESTS:    EKG:

## 2023-10-17 NOTE — H&P ADULT - NSHPLABSRESULTS_GEN_ALL_CORE
13.6   18.38 )-----------( 559      ( 16 Oct 2023 16:34 )             41.1     138  |  101  |  8   ----------------------------<  93     10-16  3.5   |  25  |  0.94    Ca    10.1      16 Oct 2023 22:04  Phos  4.4     10-16  Mg     2.20     10-16    TPro  8.3  /  Alb  4.3  /  TBili  0.3  /  DBili  x   /  AST  8   /  ALT  13  /  AlkPhos  155<H>  10-16    PT/INR: 12.3/1.10 (10-16-23 @ 16:40)  PTT: 27.7 (10-16-23 @ 16:40)  PT/INR: 12.3/1.10 (10-16-23 @ 16:34)  PTT: -- (10-16-23 @ 16:34)      22:04 - VBG - pH: 7.38  | pCO2: 45    | pO2: 53    | Lactate: 1.1

## 2023-10-17 NOTE — CONSULT NOTE ADULT - ASSESSMENT
28 year old M w/ recent findings of mediastinal mass with biopsy who presents for workup given recent inconclusive biopsy results.    #Mediastinal adenopathy  multiple mediastinal nodes amenable to EBUS Bx. C/f lymphoma noted. Prior nondiagnostic transthoracic bx performed at [facility].  - Plan for EBUS 10/18  - NPO @ [facility]  - check cbc, chemistries and coags w AM labs  - hold DVT ppx

## 2023-10-17 NOTE — H&P ADULT - HISTORY OF PRESENT ILLNESS
Mr. Gaitan is a 28 year old M w/ recent findings of mediastinal mass with biopsy who presents for workup given recent inconclusive biopsy results. He was initially sent by his PCP to ED for chest pain 3 weeks ago, when patient was admitted at [facility] for workup. He describes a left sided sharp chest pain that is intermittent that lasts a few seconds which radiates to the left shoulder and is worse with coughing or movements. At [facility], bulky mediastinal lymphadenopathy was seen and biopsied. Preliminary reports from the mediastinal lymph node biopsy that showed a few large atypical cells, many foamy cells, no Tonio-Daniel cells with a request for more tissue is needed for further characterization. Lab work from [facility] showed the following (10/4/23): WBC 26.7, Hgb 12.7, MCV 83, Total Protein 9.4, ALK Phos 192. CT abdomen and pelvis w/ IV contrast showed bulky mediastinal LAD with a dominant anterior mediastinal conglomerate measuring 6.6x6.2 cm and a right paratracheal lymph node measuring 3.4 cm x2.6 cm. Labs at [facility] were sig for the following: WBC 18.38, , neutrophil 14.18, and neutrophil 77%, lymph 12%, fibrinogen 50, d-dimer 270, alk phos 155, ->505, uric acid 9->8.9, H/H 13.6/41.1, MCV 80 and LA 1.1. CTA chest showed no pulmonary embolism. EKG showed sinus tach, , no st/t changes, qtc of 438 ms. He denies any other symptoms of abdominal pain, change in bowel habits, nausea, vomiting, rashes, itching, fever, or chills. He reports no family history of any lymphoma, leukemia, or any other cancer in the family.

## 2023-10-17 NOTE — H&P ADULT - NSHPREVIEWOFSYSTEMS_GEN_ALL_CORE
Review of Systems:   CONSTITUTIONAL: No fever  EYES: No eye pain, visual disturbances, or discharge  ENMT:  No difficulty hearing, tinnitus, vertigo; No sinus or throat pain  RESPIRATORY: No SOB. No cough, wheezing, chills or hemoptysis  CARDIOVASCULAR: +chest pain, no palpitations, no dizziness, no leg swelling  GASTROINTESTINAL: No abdominal or epigastric pain. No nausea, vomiting, or hematemesis; No diarrhea or constipation. No melena or hematochezia.  GENITOURINARY: No dysuria, frequency, hematuria, or incontinence  NEUROLOGICAL: No headaches, memory loss, loss of strength, numbness, or tremors  SKIN: No itching, burning, rashes, or lesions   LYMPH NODES: No enlarged glands  ENDOCRINE: No heat or cold intolerance; No hair loss  MUSCULOSKELETAL: No joint pain or swelling; No muscle, back pain  PSYCHIATRIC: No depression, anxiety, mood swings, or difficulty sleeping

## 2023-10-17 NOTE — CONSULT NOTE ADULT - ASSESSMENT
28 year old M w/ recent findings of mediastinal mass with biopsy who presents for workup given recent inconclusive biopsy results.  He had the following labs at [facility] (10/4/23): WBC 26.7, Hgb 12.7, MCV 83, Total Protein 9.4, ALK Phos 192. Patient had a mediastinal lymph node biopsy that showed a few large atypical cells, many foamy cells, no Tonio-Daniel cells. More tissue is needed for further characterization.     # Mediastinal mass, r/o lymphoma  - CBC w/ diff daily, TLS labs (uric acid, LDH, Phos, CMP)  - Continue supportive transfusions as needed to maintain Hgb > 7 and plt > 10.   - Trend DIC labs (PT, INR, PTT, D-dimer) daily  - Trend TLS labs (CMP, Phos, uric acid, LDH) daily  - Continue IV hydration at 100 cc/hour  - Start allopurinol 300 mg daily.  Consider rasburicase if uric acid > 8. Please check G6PD level prior to giving rasburicase  - CT c/a/p w/ IV contrast-> No pulmonary embolism. Bulky mediastinal lymphadenopathy with a dominant anterior mediastinal   node measuring 6.6 cm. Findings consistent with neoplasm. No acute findings in the abdomen or pelvis.  - recommend core biopsy with CT Sx or pulm via EBUS depending on location of lymph node  - Will send peripheral flow cytometry  - Will review peripheral smear  - Infectious work up per primary team and ID  - Workup for other causes of mediastinal masses: AFB, HCG, TSH    28 year old M w/ recent findings of mediastinal mass with biopsy who presents for workup given recent inconclusive biopsy results.  He had the following labs at [facility] (10/4/23): WBC 26.7, Hgb 12.7, MCV 83, Total Protein 9.4, ALK Phos 192. Patient had a mediastinal lymph node biopsy that showed a few large atypical cells, many foamy cells, no Tonio-Daniel cells. More tissue is needed for further characterization.     # Mediastinal mass, r/o lymphoma  - CBC w/ diff daily, TLS labs (uric acid, LDH, Phos, CMP)  - Continue supportive transfusions as needed to maintain Hgb > 7 and plt > 10.   - Trend DIC labs (PT, INR, PTT, D-dimer) daily  - Trend TLS labs (CMP, Phos, uric acid, LDH) daily  - Continue IV hydration at 100 cc/hour  - Start allopurinol 300 mg daily.  Consider rasburicase if uric acid > 8. Please check G6PD level prior to giving rasburicase  - Pls check HIV, hepatitis panel, TTE  - CT c/a/p w/ IV contrast-> No pulmonary embolism. Bulky mediastinal lymphadenopathy with a dominant anterior mediastinal node measuring 6.6 cm. Findings consistent with neoplasm. No acute findings in the abdomen or pelvis.  - recommend core biopsy with CT Sx or pulm via EBUS depending on location of lymph node  - f/up peripheral flow cytometry  - review peripheral smear-> increased immature looking lymphocytes and a few plasma-cells, increased platelets per hpf.  - Given that patient has gamma gap 5.2 on admission, would also send SPEP, UPEP, immunofixation, serum immunoglobulin levels, Kappa/lambda ratio  - Infectious work up per primary team and ID  - Workup for other causes of mediastinal masses: pls send AFB, HCG, TSH

## 2023-10-17 NOTE — H&P ADULT - PROBLEM SELECTOR PLAN 1
- daily TLS labs (uric acid, LDH, Phos, CMP), daily DIC labs (PT, INR, PTT, D-dimer) daily  - Continue IV hydration at 100 cc/hour  - ordered allopurinol 300 mg daily.  will consider rasburicase if uric acid > 8, but will hold for now until G6PD level  checked (ordered for this AM) prior to giving rasburicase.   -IR consult in AM -for core biopsy with IR or EBUS depending on location of lymph node (either mediastinal conglomerate, or more likely right paratracheal lymph node measuring 3.4 cm).   -heme to send peripheral flow cytometry and to review peripheral smear  -continue to monitor   -will workup for other types of mediastinal masses: AFB, HCG, TSH  -Hematology consulted, formal consult in AM, appreciate recs -currently does not meet criteria for adalgisa-lay criteria-1/4, continue to monitor   - daily TLS labs (uric acid, LDH, Phos, CMP), daily DIC labs (PT, INR, PTT, D-dimer) daily  - Continue IV hydration at 100 cc/hour  - ordered allopurinol 300 mg daily.  will consider rasburicase if uric acid > 8, but will hold for now until G6PD level  checked (ordered for this AM) prior to giving rasburicase.   -IR consult in AM -for core biopsy with IR or EBUS depending on location of lymph node (either mediastinal conglomerate, or more likely right paratracheal lymph node measuring 3.4 cm).   -heme to send peripheral flow cytometry and to review peripheral smear  -will workup for other types of mediastinal masses: AFB, HCG, TSH  -Hematology consulted, formal consult in AM, appreciate recs

## 2023-10-17 NOTE — CONSULT NOTE ADULT - SUBJECTIVE AND OBJECTIVE BOX
HPI:  Mr. Gaitan is a 28 year old M w/ recent findings of mediastinal mass with biopsy who presents for workup given recent inconclusive biopsy results. He was initially sent by his PCP to ED for chest pain 3 weeks ago, when patient was admitted at [facility] for workup. He describes a left sided sharp chest pain that is intermittent that lasts a few seconds which radiates to the left shoulder and is worse with coughing or movements. At [facility], bulky mediastinal lymphadenopathy was seen and biopsied. Preliminary reports from the mediastinal lymph node biopsy that showed a few large atypical cells, many foamy cells, no Tonio-Daniel cells with a request for more tissue is needed for further characterization. Lab work from [facility] showed the following (10/4/23): WBC 26.7, Hgb 12.7, MCV 83, Total Protein 9.4, ALK Phos 192. CT abdomen and pelvis w/ IV contrast showed bulky mediastinal LAD with a dominant anterior mediastinal conglomerate measuring 6.6x6.2 cm and a right paratracheal lymph node measuring 3.4 cm x2.6 cm. Labs at [facility] were sig for the following: WBC 18.38, , neutrophil 14.18, and neutrophil 77%, lymph 12%, fibrinogen 50, d-dimer 270, alk phos 155, ->505, uric acid 9->8.9, H/H 13.6/41.1, MCV 80 and LA 1.1. CTA chest showed no pulmonary embolism. EKG showed sinus tach, , no st/t changes, qtc of 438 ms. He denies any other symptoms of abdominal pain, change in bowel habits, nausea, vomiting, rashes, itching, fever, or chills. He reports no family history of any lymphoma, leukemia, or any other cancer in the family.  (17 Oct 2023 04:05)      PAST MEDICAL & SURGICAL HISTORY:  No pertinent past medical history      No significant past surgical history          Allergies    No Known Allergies    Intolerances        MEDICATIONS  (STANDING):  allopurinol 300 milliGRAM(s) Oral daily  enoxaparin Injectable 40 milliGRAM(s) SubCutaneous every 24 hours  lactated ringers. 1000 milliLiter(s) (100 mL/Hr) IV Continuous <Continuous>    MEDICATIONS  (PRN):  acetaminophen     Tablet .. 650 milliGRAM(s) Oral every 6 hours PRN Mild Pain (1 - 3)  melatonin 3 milliGRAM(s) Oral at bedtime PRN Insomnia      FAMILY HISTORY:  No pertinent family history in first degree relatives        SOCIAL HISTORY: No EtOH, no tobacco    REVIEW OF SYSTEMS:    CONSTITUTIONAL: No weakness, fevers or chills  EYES/ENT: No visual changes;  No vertigo or throat pain   NECK: No pain or stiffness  RESPIRATORY: No cough, wheezing, hemoptysis; No shortness of breath  CARDIOVASCULAR: No chest pain or palpitations  GASTROINTESTINAL: No abdominal or epigastric pain. No nausea, vomiting, or hematemesis; No diarrhea or constipation. No melena or hematochezia.  GENITOURINARY: No dysuria, frequency or hematuria  NEUROLOGICAL: No numbness or weakness  SKIN: No itching, burning, rashes, or lesions   All other review of systems is negative unless indicated above.        T(F): 98.1 (10-17-23 @ 05:33), Max: 99 (10-16-23 @ 18:14)  HR: 85 (10-17-23 @ 05:33)  BP: 121/74 (10-17-23 @ 05:33)  RR: 16 (10-17-23 @ 05:33)  SpO2: 100% (10-17-23 @ 05:33)  Wt(kg): --    GENERAL: NAD, well-developed  HEAD:  Atraumatic, Normocephalic  EYES: EOMI, PERRLA, conjunctiva and sclera clear  NECK: Supple, No JVD  CHEST/LUNG: Clear to auscultation bilaterally; No wheeze  HEART: Regular rate and rhythm; No murmurs, rubs, or gallops  ABDOMEN: Soft, Nontender, Nondistended; Bowel sounds present  EXTREMITIES:  2+ Peripheral Pulses, No clubbing, cyanosis, or edema  NEUROLOGY: non-focal  SKIN: No rashes or lesions                          11.7   18.20 )-----------( 417      ( 17 Oct 2023 04:55 )             36.5       10-17    140  |  105  |  7   ----------------------------<  100<H>  3.8   |  23  |  0.87    Ca    9.3      17 Oct 2023 04:55  Phos  4.2     10-17  Mg     2.20     10-17    TPro  8.2  /  Alb  3.9  /  TBili  0.5  /  DBili  x   /  AST  7   /  ALT  12  /  AlkPhos  143<H>  10-17      Magnesium: 2.20 mg/dL (10-17 @ 04:55)  Phosphorus: 4.2 mg/dL (10-17 @ 04:55)  Uric Acid: 8.6 mg/dL (10-17 @ 04:55)  Lactate Dehydrogenase, Serum: 111 U/L (10-17 @ 04:55)  Magnesium: 2.20 mg/dL (10-16 @ 22:04)  Phosphorus: 4.4 mg/dL (10-16 @ 22:04)  Lactate Dehydrogenase, Serum: 119 U/L (10-16 @ 22:04)  Uric Acid: 8.9 mg/dL (10-16 @ 22:04)  Lactate Dehydrogenase, Serum: 505 U/L (10-16 @ 16:34)  Uric Acid: 9.0 mg/dL (10-16 @ 16:34)  Magnesium: 2.40 mg/dL (10-16 @ 16:34)  Phosphorus: 3.5 mg/dL (10-16 @ 16:34)      PT/INR - ( 17 Oct 2023 04:55 )   PT: 12.9 sec;   INR: 1.15 ratio         PTT - ( 17 Oct 2023 04:55 )  PTT:32.1 sec     HPI:  Mr. Gaitan is a 28 year old M w/ recent findings of mediastinal mass with biopsy who presents for workup given recent inconclusive biopsy results. He was initially sent by his PCP to ED for chest pain 3 weeks ago, when patient was admitted at [facility] for workup. He describes a left sided sharp chest pain that is intermittent that lasts a few seconds which radiates to the left shoulder and is worse with coughing or movements. At [facility], bulky mediastinal lymphadenopathy was seen and biopsied. Preliminary reports from the mediastinal lymph node biopsy that showed a few large atypical cells, many foamy cells, no Tonio-Daniel cells with a request for more tissue is needed for further characterization.     Lab work from [facility] showed the following (10/4/23): WBC 26.7, Hgb 12.7, MCV 83, Total Protein 9.4, ALK Phos 192. CT abdomen and pelvis w/ IV contrast showed bulky mediastinal LAD with a dominant anterior mediastinal conglomerate measuring 6.6x6.2 cm and a right paratracheal lymph node measuring 3.4 cm x2.6 cm. Labs at [facility] were sig for the following: WBC 18.38, , neutrophil 14.18, and neutrophil 77%, lymph 12%, fibrinogen 50, d-dimer 270, alk phos 155, ->505, uric acid 9->8.9, H/H 13.6/41.1, MCV 80 and LA 1.1. CTA chest showed no pulmonary embolism. EKG showed sinus tach, , no st/t changes, qtc of 438 ms. He denies any other symptoms of abdominal pain, change in bowel habits, nausea, vomiting, rashes, itching, fever, or chills. He reports no family history of any lymphoma, leukemia, or any other cancer in the family.        PAST MEDICAL & SURGICAL HISTORY:  No pertinent past medical history      No significant past surgical history          Allergies    No Known Allergies    Intolerances        MEDICATIONS  (STANDING):  allopurinol 300 milliGRAM(s) Oral daily  enoxaparin Injectable 40 milliGRAM(s) SubCutaneous every 24 hours  lactated ringers. 1000 milliLiter(s) (100 mL/Hr) IV Continuous <Continuous>    MEDICATIONS  (PRN):  acetaminophen     Tablet .. 650 milliGRAM(s) Oral every 6 hours PRN Mild Pain (1 - 3)  melatonin 3 milliGRAM(s) Oral at bedtime PRN Insomnia      FAMILY HISTORY:  No pertinent family history in first degree relatives        SOCIAL HISTORY: No EtOH, no tobacco    REVIEW OF SYSTEMS:    CONSTITUTIONAL: No weakness, fevers or chills  EYES/ENT: No visual changes;  No vertigo or throat pain   NECK: No pain or stiffness  RESPIRATORY: No cough, wheezing, hemoptysis; No shortness of breath  CARDIOVASCULAR: No chest pain or palpitations  GASTROINTESTINAL: No abdominal or epigastric pain. No nausea, vomiting, or hematemesis; No diarrhea or constipation. No melena or hematochezia.  GENITOURINARY: No dysuria, frequency or hematuria  NEUROLOGICAL: No numbness or weakness  SKIN: No itching, burning, rashes, or lesions   All other review of systems is negative unless indicated above.        T(F): 98.1 (10-17-23 @ 05:33), Max: 99 (10-16-23 @ 18:14)  HR: 85 (10-17-23 @ 05:33)  BP: 121/74 (10-17-23 @ 05:33)  RR: 16 (10-17-23 @ 05:33)  SpO2: 100% (10-17-23 @ 05:33)  Wt(kg): --    GENERAL: NAD, well-developed  HEAD:  Atraumatic, Normocephalic  EYES: EOMI, PERRLA, conjunctiva and sclera clear  NECK: Supple, No JVD  CHEST/LUNG: Clear to auscultation bilaterally; No wheeze  HEART: Regular rate and rhythm; No murmurs, rubs, or gallops  ABDOMEN: Soft, Nontender, Nondistended; Bowel sounds present  EXTREMITIES:  2+ Peripheral Pulses, No clubbing, cyanosis, or edema  NEUROLOGY: non-focal  SKIN: No rashes or lesions                          11.7   18.20 )-----------( 417      ( 17 Oct 2023 04:55 )             36.5       10-17    140  |  105  |  7   ----------------------------<  100<H>  3.8   |  23  |  0.87    Ca    9.3      17 Oct 2023 04:55  Phos  4.2     10-17  Mg     2.20     10-17    TPro  8.2  /  Alb  3.9  /  TBili  0.5  /  DBili  x   /  AST  7   /  ALT  12  /  AlkPhos  143<H>  10-17      Magnesium: 2.20 mg/dL (10-17 @ 04:55)  Phosphorus: 4.2 mg/dL (10-17 @ 04:55)  Uric Acid: 8.6 mg/dL (10-17 @ 04:55)  Lactate Dehydrogenase, Serum: 111 U/L (10-17 @ 04:55)  Magnesium: 2.20 mg/dL (10-16 @ 22:04)  Phosphorus: 4.4 mg/dL (10-16 @ 22:04)  Lactate Dehydrogenase, Serum: 119 U/L (10-16 @ 22:04)  Uric Acid: 8.9 mg/dL (10-16 @ 22:04)  Lactate Dehydrogenase, Serum: 505 U/L (10-16 @ 16:34)  Uric Acid: 9.0 mg/dL (10-16 @ 16:34)  Magnesium: 2.40 mg/dL (10-16 @ 16:34)  Phosphorus: 3.5 mg/dL (10-16 @ 16:34)      PT/INR - ( 17 Oct 2023 04:55 )   PT: 12.9 sec;   INR: 1.15 ratio         PTT - ( 17 Oct 2023 04:55 )  PTT:32.1 sec     HPI:  Mr. Gaitan is a 28 year old M w/ recent findings of mediastinal mass with biopsy who presents for workup given recent inconclusive biopsy results. He was initially sent by his PCP to ED for chest pain 3 weeks ago, when patient was admitted at [facility] for workup. He describes a left sided sharp chest pain that is intermittent that lasts a few seconds which radiates to the left shoulder and is worse with coughing or movements. At [facility], bulky mediastinal lymphadenopathy was seen and biopsied. Preliminary reports from the mediastinal lymph node biopsy that showed a few large atypical cells, many foamy cells, no Tonio-Daniel cells with a request for more tissue is needed for further characterization.     Lab work from [facility] showed the following (10/4/23): WBC 26.7, Hgb 12.7, MCV 83, Total Protein 9.4, ALK Phos 192. CT abdomen and pelvis w/ IV contrast showed bulky mediastinal LAD with a dominant anterior mediastinal conglomerate measuring 6.6x6.2 cm and a right paratracheal lymph node measuring 3.4 cm x2.6 cm. Labs at [facility] were sig for the following: WBC 18.38, , neutrophil 14.18, and neutrophil 77%, lymph 12%, fibrinogen 50, d-dimer 270, alk phos 155, ->505, uric acid 9->8.9, H/H 13.6/41.1, MCV 80 and LA 1.1. CTA chest showed no pulmonary embolism. EKG showed sinus tach, , no st/t changes, qtc of 438 ms. He denies any other symptoms of abdominal pain, change in bowel habits, nausea, vomiting, rashes, itching, fever, or chills. He reports no family history of any lymphoma, leukemia, or any other cancer in the family.        PAST MEDICAL & SURGICAL HISTORY:  No pertinent past medical history      No significant past surgical history          Allergies    No Known Allergies    Intolerances        MEDICATIONS  (STANDING):  allopurinol 300 milliGRAM(s) Oral daily  enoxaparin Injectable 40 milliGRAM(s) SubCutaneous every 24 hours  lactated ringers. 1000 milliLiter(s) (100 mL/Hr) IV Continuous <Continuous>    MEDICATIONS  (PRN):  acetaminophen     Tablet .. 650 milliGRAM(s) Oral every 6 hours PRN Mild Pain (1 - 3)  melatonin 3 milliGRAM(s) Oral at bedtime PRN Insomnia      FAMILY HISTORY:  No pertinent family history in first degree relatives        SOCIAL HISTORY: No EtOH, no tobacco    REVIEW OF SYSTEMS:    CONSTITUTIONAL: No weakness, fevers or chills  EYES/ENT: No visual changes;  No vertigo or throat pain   NECK: No pain or stiffness  RESPIRATORY: No cough, wheezing, hemoptysis; No shortness of breath  CARDIOVASCULAR: No chest pain or palpitations  GASTROINTESTINAL: No abdominal or epigastric pain. No nausea, vomiting, or hematemesis; No diarrhea or constipation. No melena or hematochezia.  GENITOURINARY: No dysuria, frequency or hematuria  NEUROLOGICAL: No numbness or weakness  SKIN: No itching, burning, rashes, or lesions   All other review of systems is negative unless indicated above.        T(F): 98.1 (10-17-23 @ 05:33), Max: 99 (10-16-23 @ 18:14)  HR: 85 (10-17-23 @ 05:33)  BP: 121/74 (10-17-23 @ 05:33)  RR: 16 (10-17-23 @ 05:33)  SpO2: 100% (10-17-23 @ 05:33)  Wt(kg): --    GENERAL: NAD, well-developed  HEAD:  Atraumatic, Normocephalic  EYES: EOMI, PERRLA, conjunctiva and sclera clear  NECK: Supple, No JVD  CHEST/LUNG: Clear to auscultation bilaterally; No wheeze  HEART: Regular rate and rhythm; No murmurs, rubs, or gallops  ABDOMEN: Soft, Nontender, Nondistended; Bowel sounds present  EXTREMITIES:  2+ Peripheral Pulses, No clubbing, cyanosis, or edema  NEUROLOGY: non-focal  SKIN: No rashes or lesions                          11.7   18.20 )-----------( 417      ( 17 Oct 2023 04:55 )             36.5       10-17    140  |  105  |  7   ----------------------------<  100<H>  3.8   |  23  |  0.87    Ca    9.3      17 Oct 2023 04:55  Phos  4.2     10-17  Mg     2.20     10-17    TPro  8.2  /  Alb  3.9  /  TBili  0.5  /  DBili  x   /  AST  7   /  ALT  12  /  AlkPhos  143<H>  10-17      Magnesium: 2.20 mg/dL (10-17 @ 04:55)  Phosphorus: 4.2 mg/dL (10-17 @ 04:55)  Uric Acid: 8.6 mg/dL (10-17 @ 04:55)  Lactate Dehydrogenase, Serum: 111 U/L (10-17 @ 04:55)  Magnesium: 2.20 mg/dL (10-16 @ 22:04)  Phosphorus: 4.4 mg/dL (10-16 @ 22:04)  Lactate Dehydrogenase, Serum: 119 U/L (10-16 @ 22:04)  Uric Acid: 8.9 mg/dL (10-16 @ 22:04)  Lactate Dehydrogenase, Serum: 505 U/L (10-16 @ 16:34)  Uric Acid: 9.0 mg/dL (10-16 @ 16:34)  Magnesium: 2.40 mg/dL (10-16 @ 16:34)  Phosphorus: 3.5 mg/dL (10-16 @ 16:34)      PT/INR - ( 17 Oct 2023 04:55 )   PT: 12.9 sec;   INR: 1.15 ratio    PTT - ( 17 Oct 2023 04:55 )  PTT:32.1 sec

## 2023-10-17 NOTE — H&P ADULT - PROBLEM SELECTOR PLAN 3
DVT prophylaxis: lovenox 40 mg daily   GI prophylaxis: none   Diet: NPO for core biopsy this morning   Full code

## 2023-10-17 NOTE — H&P ADULT - ASSESSMENT
Mr. Gaitan is a 28 year old M w/ recent findings of mediastinal mass with biopsy who presents for expedited workup given recent inconclusive biopsy results.

## 2023-10-18 ENCOUNTER — RESULT REVIEW (OUTPATIENT)
Age: 28
End: 2023-10-18

## 2023-10-18 LAB
ALBUMIN SERPL ELPH-MCNC: 3.9 G/DL — SIGNIFICANT CHANGE UP (ref 3.3–5)
ALBUMIN SERPL ELPH-MCNC: 3.9 G/DL — SIGNIFICANT CHANGE UP (ref 3.3–5)
ALP SERPL-CCNC: 151 U/L — HIGH (ref 40–120)
ALP SERPL-CCNC: 151 U/L — HIGH (ref 40–120)
ALT FLD-CCNC: 14 U/L — SIGNIFICANT CHANGE UP (ref 4–41)
ALT FLD-CCNC: 14 U/L — SIGNIFICANT CHANGE UP (ref 4–41)
ANION GAP SERPL CALC-SCNC: 12 MMOL/L — SIGNIFICANT CHANGE UP (ref 7–14)
ANION GAP SERPL CALC-SCNC: 12 MMOL/L — SIGNIFICANT CHANGE UP (ref 7–14)
APTT BLD: 31.3 SEC — SIGNIFICANT CHANGE UP (ref 24.5–35.6)
APTT BLD: 31.3 SEC — SIGNIFICANT CHANGE UP (ref 24.5–35.6)
AST SERPL-CCNC: 14 U/L — SIGNIFICANT CHANGE UP (ref 4–40)
AST SERPL-CCNC: 14 U/L — SIGNIFICANT CHANGE UP (ref 4–40)
BASOPHILS # BLD AUTO: 0.13 K/UL — SIGNIFICANT CHANGE UP (ref 0–0.2)
BASOPHILS # BLD AUTO: 0.13 K/UL — SIGNIFICANT CHANGE UP (ref 0–0.2)
BASOPHILS NFR BLD AUTO: 0.7 % — SIGNIFICANT CHANGE UP (ref 0–2)
BASOPHILS NFR BLD AUTO: 0.7 % — SIGNIFICANT CHANGE UP (ref 0–2)
BILIRUB SERPL-MCNC: 0.3 MG/DL — SIGNIFICANT CHANGE UP (ref 0.2–1.2)
BILIRUB SERPL-MCNC: 0.3 MG/DL — SIGNIFICANT CHANGE UP (ref 0.2–1.2)
BUN SERPL-MCNC: 7 MG/DL — SIGNIFICANT CHANGE UP (ref 7–23)
BUN SERPL-MCNC: 7 MG/DL — SIGNIFICANT CHANGE UP (ref 7–23)
CALCIUM SERPL-MCNC: 9.3 MG/DL — SIGNIFICANT CHANGE UP (ref 8.4–10.5)
CALCIUM SERPL-MCNC: 9.3 MG/DL — SIGNIFICANT CHANGE UP (ref 8.4–10.5)
CHLORIDE SERPL-SCNC: 101 MMOL/L — SIGNIFICANT CHANGE UP (ref 98–107)
CHLORIDE SERPL-SCNC: 101 MMOL/L — SIGNIFICANT CHANGE UP (ref 98–107)
CO2 SERPL-SCNC: 24 MMOL/L — SIGNIFICANT CHANGE UP (ref 22–31)
CO2 SERPL-SCNC: 24 MMOL/L — SIGNIFICANT CHANGE UP (ref 22–31)
CREAT SERPL-MCNC: 0.76 MG/DL — SIGNIFICANT CHANGE UP (ref 0.5–1.3)
CREAT SERPL-MCNC: 0.76 MG/DL — SIGNIFICANT CHANGE UP (ref 0.5–1.3)
D DIMER BLD IA.RAPID-MCNC: 243 NG/ML DDU — HIGH
D DIMER BLD IA.RAPID-MCNC: 243 NG/ML DDU — HIGH
EGFR: 127 ML/MIN/1.73M2 — SIGNIFICANT CHANGE UP
EGFR: 127 ML/MIN/1.73M2 — SIGNIFICANT CHANGE UP
EOSINOPHIL # BLD AUTO: 0.53 K/UL — HIGH (ref 0–0.5)
EOSINOPHIL # BLD AUTO: 0.53 K/UL — HIGH (ref 0–0.5)
EOSINOPHIL NFR BLD AUTO: 2.7 % — SIGNIFICANT CHANGE UP (ref 0–6)
EOSINOPHIL NFR BLD AUTO: 2.7 % — SIGNIFICANT CHANGE UP (ref 0–6)
GLUCOSE SERPL-MCNC: 86 MG/DL — SIGNIFICANT CHANGE UP (ref 70–99)
GLUCOSE SERPL-MCNC: 86 MG/DL — SIGNIFICANT CHANGE UP (ref 70–99)
HCT VFR BLD CALC: 36.4 % — LOW (ref 39–50)
HCT VFR BLD CALC: 36.4 % — LOW (ref 39–50)
HGB BLD-MCNC: 12 G/DL — LOW (ref 13–17)
HGB BLD-MCNC: 12 G/DL — LOW (ref 13–17)
IANC: 16.17 K/UL — HIGH (ref 1.8–7.4)
IANC: 16.17 K/UL — HIGH (ref 1.8–7.4)
IMM GRANULOCYTES NFR BLD AUTO: 0.4 % — SIGNIFICANT CHANGE UP (ref 0–0.9)
IMM GRANULOCYTES NFR BLD AUTO: 0.4 % — SIGNIFICANT CHANGE UP (ref 0–0.9)
INR BLD: 1.1 RATIO — SIGNIFICANT CHANGE UP (ref 0.85–1.18)
INR BLD: 1.1 RATIO — SIGNIFICANT CHANGE UP (ref 0.85–1.18)
LDH SERPL L TO P-CCNC: 149 U/L — SIGNIFICANT CHANGE UP (ref 135–225)
LDH SERPL L TO P-CCNC: 149 U/L — SIGNIFICANT CHANGE UP (ref 135–225)
LYMPHOCYTES # BLD AUTO: 1.82 K/UL — SIGNIFICANT CHANGE UP (ref 1–3.3)
LYMPHOCYTES # BLD AUTO: 1.82 K/UL — SIGNIFICANT CHANGE UP (ref 1–3.3)
LYMPHOCYTES # BLD AUTO: 9.2 % — LOW (ref 13–44)
LYMPHOCYTES # BLD AUTO: 9.2 % — LOW (ref 13–44)
MAGNESIUM SERPL-MCNC: 2.2 MG/DL — SIGNIFICANT CHANGE UP (ref 1.6–2.6)
MAGNESIUM SERPL-MCNC: 2.2 MG/DL — SIGNIFICANT CHANGE UP (ref 1.6–2.6)
MCHC RBC-ENTMCNC: 26.9 PG — LOW (ref 27–34)
MCHC RBC-ENTMCNC: 26.9 PG — LOW (ref 27–34)
MCHC RBC-ENTMCNC: 33 GM/DL — SIGNIFICANT CHANGE UP (ref 32–36)
MCHC RBC-ENTMCNC: 33 GM/DL — SIGNIFICANT CHANGE UP (ref 32–36)
MCV RBC AUTO: 81.6 FL — SIGNIFICANT CHANGE UP (ref 80–100)
MCV RBC AUTO: 81.6 FL — SIGNIFICANT CHANGE UP (ref 80–100)
MONOCYTES # BLD AUTO: 1.1 K/UL — HIGH (ref 0–0.9)
MONOCYTES # BLD AUTO: 1.1 K/UL — HIGH (ref 0–0.9)
MONOCYTES NFR BLD AUTO: 5.5 % — SIGNIFICANT CHANGE UP (ref 2–14)
MONOCYTES NFR BLD AUTO: 5.5 % — SIGNIFICANT CHANGE UP (ref 2–14)
NEUTROPHILS # BLD AUTO: 16.17 K/UL — HIGH (ref 1.8–7.4)
NEUTROPHILS # BLD AUTO: 16.17 K/UL — HIGH (ref 1.8–7.4)
NEUTROPHILS NFR BLD AUTO: 81.5 % — HIGH (ref 43–77)
NEUTROPHILS NFR BLD AUTO: 81.5 % — HIGH (ref 43–77)
NRBC # BLD: 0 /100 WBCS — SIGNIFICANT CHANGE UP (ref 0–0)
NRBC # BLD: 0 /100 WBCS — SIGNIFICANT CHANGE UP (ref 0–0)
NRBC # FLD: 0 K/UL — SIGNIFICANT CHANGE UP (ref 0–0)
NRBC # FLD: 0 K/UL — SIGNIFICANT CHANGE UP (ref 0–0)
PHOSPHATE SERPL-MCNC: 4.2 MG/DL — SIGNIFICANT CHANGE UP (ref 2.5–4.5)
PHOSPHATE SERPL-MCNC: 4.2 MG/DL — SIGNIFICANT CHANGE UP (ref 2.5–4.5)
PLATELET # BLD AUTO: 451 K/UL — HIGH (ref 150–400)
PLATELET # BLD AUTO: 451 K/UL — HIGH (ref 150–400)
POTASSIUM SERPL-MCNC: 3.9 MMOL/L — SIGNIFICANT CHANGE UP (ref 3.5–5.3)
POTASSIUM SERPL-MCNC: 3.9 MMOL/L — SIGNIFICANT CHANGE UP (ref 3.5–5.3)
POTASSIUM SERPL-SCNC: 3.9 MMOL/L — SIGNIFICANT CHANGE UP (ref 3.5–5.3)
POTASSIUM SERPL-SCNC: 3.9 MMOL/L — SIGNIFICANT CHANGE UP (ref 3.5–5.3)
PROT SERPL-MCNC: 7.9 G/DL — SIGNIFICANT CHANGE UP (ref 6–8.3)
PROT SERPL-MCNC: 7.9 G/DL — SIGNIFICANT CHANGE UP (ref 6–8.3)
PROTHROM AB SERPL-ACNC: 12.3 SEC — SIGNIFICANT CHANGE UP (ref 9.5–13)
PROTHROM AB SERPL-ACNC: 12.3 SEC — SIGNIFICANT CHANGE UP (ref 9.5–13)
RBC # BLD: 4.46 M/UL — SIGNIFICANT CHANGE UP (ref 4.2–5.8)
RBC # BLD: 4.46 M/UL — SIGNIFICANT CHANGE UP (ref 4.2–5.8)
RBC # FLD: 14.4 % — SIGNIFICANT CHANGE UP (ref 10.3–14.5)
RBC # FLD: 14.4 % — SIGNIFICANT CHANGE UP (ref 10.3–14.5)
SODIUM SERPL-SCNC: 137 MMOL/L — SIGNIFICANT CHANGE UP (ref 135–145)
SODIUM SERPL-SCNC: 137 MMOL/L — SIGNIFICANT CHANGE UP (ref 135–145)
TM INTERPRETATION: SIGNIFICANT CHANGE UP
TM INTERPRETATION: SIGNIFICANT CHANGE UP
URATE SERPL-MCNC: 6.6 MG/DL — SIGNIFICANT CHANGE UP (ref 3.4–8.8)
URATE SERPL-MCNC: 6.6 MG/DL — SIGNIFICANT CHANGE UP (ref 3.4–8.8)
WBC # BLD: 19.83 K/UL — HIGH (ref 3.8–10.5)
WBC # BLD: 19.83 K/UL — HIGH (ref 3.8–10.5)
WBC # FLD AUTO: 19.83 K/UL — HIGH (ref 3.8–10.5)
WBC # FLD AUTO: 19.83 K/UL — HIGH (ref 3.8–10.5)

## 2023-10-18 PROCEDURE — 99233 SBSQ HOSP IP/OBS HIGH 50: CPT | Mod: GC,25

## 2023-10-18 PROCEDURE — 88173 CYTOPATH EVAL FNA REPORT: CPT | Mod: 26

## 2023-10-18 PROCEDURE — 99233 SBSQ HOSP IP/OBS HIGH 50: CPT

## 2023-10-18 PROCEDURE — 31624 DX BRONCHOSCOPE/LAVAGE: CPT | Mod: GC

## 2023-10-18 PROCEDURE — 88341 IMHCHEM/IMCYTCHM EA ADD ANTB: CPT | Mod: 26,59

## 2023-10-18 PROCEDURE — 31645 BRNCHSC W/THER ASPIR 1ST: CPT | Mod: GC

## 2023-10-18 PROCEDURE — 31628 BRONCHOSCOPY/LUNG BX EACH: CPT | Mod: GC

## 2023-10-18 PROCEDURE — 31652 BRONCH EBUS SAMPLNG 1/2 NODE: CPT | Mod: GC

## 2023-10-18 PROCEDURE — 88365 INSITU HYBRIDIZATION (FISH): CPT | Mod: 26

## 2023-10-18 PROCEDURE — 88342 IMHCHEM/IMCYTCHM 1ST ANTB: CPT | Mod: 26,59

## 2023-10-18 PROCEDURE — 88305 TISSUE EXAM BY PATHOLOGIST: CPT | Mod: 26

## 2023-10-18 PROCEDURE — 88307 TISSUE EXAM BY PATHOLOGIST: CPT | Mod: 26

## 2023-10-18 PROCEDURE — 88189 FLOWCYTOMETRY/READ 16 & >: CPT

## 2023-10-18 DEVICE — PROBE CRYO FLEX 1.1X1150 MM SNGL USE: Type: IMPLANTABLE DEVICE | Status: FUNCTIONAL

## 2023-10-18 RX ORDER — FENTANYL CITRATE 50 UG/ML
25 INJECTION INTRAVENOUS
Refills: 0 | Status: DISCONTINUED | OUTPATIENT
Start: 2023-10-18 | End: 2023-10-18

## 2023-10-18 RX ORDER — ALBUTEROL 90 UG/1
2.5 AEROSOL, METERED ORAL ONCE
Refills: 0 | Status: COMPLETED | OUTPATIENT
Start: 2023-10-18 | End: 2023-10-18

## 2023-10-18 RX ORDER — FENTANYL CITRATE 50 UG/ML
50 INJECTION INTRAVENOUS ONCE
Refills: 0 | Status: DISCONTINUED | OUTPATIENT
Start: 2023-10-18 | End: 2023-10-18

## 2023-10-18 RX ORDER — ONDANSETRON 8 MG/1
4 TABLET, FILM COATED ORAL ONCE
Refills: 0 | Status: DISCONTINUED | OUTPATIENT
Start: 2023-10-18 | End: 2023-10-18

## 2023-10-18 RX ADMIN — Medication 300 MILLIGRAM(S): at 12:40

## 2023-10-18 RX ADMIN — ALBUTEROL 2.5 MILLIGRAM(S): 90 AEROSOL, METERED ORAL at 18:45

## 2023-10-18 RX ADMIN — SODIUM CHLORIDE 100 MILLILITER(S): 9 INJECTION, SOLUTION INTRAVENOUS at 09:33

## 2023-10-18 NOTE — DIETITIAN INITIAL EVALUATION ADULT - ORAL INTAKE PTA/DIET HISTORY
Patient reports no known food allergies or food intolerances. Patient does not take any nutrition supplements at home. Patient denies any chewing or swallowing difficulty. Patient reports a good appetite at baseline, typically consumes 3 meals daily plus snacks. Patient reports a decreased appetite x1 month, during which time he has been consuming small portioned meals. For example, would consume 2 spoons of rice and a small amount of lauren for one meal. Patient has likely been consuming <75% estimated energy needs during the aforementioned timeframe.    No height or weight documentation noted for current admission.   Patient reports his height as 69inches and usual body weight as 211lb. Endorses 11lb weight loss x1 month  No weight history available per Katie BLAKELY. No previous RD documentation of weights.  Writer unable to obtain bed scale weight during visit due to patient laying in a stretcher bed, no bed scale available.  Unable to corroborate patient's report of weight loss due to lack of objective weight measurements.

## 2023-10-18 NOTE — CONSULT NOTE ADULT - SUBJECTIVE AND OBJECTIVE BOX
CHIEF COMPLAINT:    HPI:    Mr. Gaitan is a 28 year old M w/ recent findings of mediastinal mass with biopsy who presents for workup given recent inconclusive biopsy results. He was initially sent by his PCP to ED for chest pain 3 weeks ago, when patient was admitted at [facility] for workup at which point imaging was performed that demonstrated bulky mediastinal lymphadenopathy that was reportedly biopsied followed by a requested for additional tissue for further characterization.  Interventional pulmonology has been consulted for further diagnostic evaluation.    On exam patient          PAST MEDICAL & SURGICAL HISTORY:  No pertinent past medical history      No significant past surgical history          FAMILY HISTORY:  No pertinent family history in first degree relatives        SOCIAL HISTORY:  Smoking: [x ] Never Smoked [ ] Former Smoker (__ packs x ___ years) [ ] Current Smoker  (__ packs x ___ years)  Substance Use: [ ] Never Used [ ] Used ____  EtOH Use:  Marital Status: [ ] Single [ ]  [ ]  [ ]   Sexual History:   Occupation:  Recent Travel:  Country of Birth:  Advance Directives:    Allergies    No Known Allergies    Intolerances        HOME MEDICATIONS:  Home Medications:      REVIEW OF SYSTEMS:  Constitutional: [ ] negative [ ] fevers [ ] chills [ ] weight loss [ ] weight gain  HEENT: [ ] negative [ ] dry eyes [ ] eye irritation [ ] postnasal drip [ ] nasal congestion  CV: [ ] negative  [ ] chest pain [ ] orthopnea [ ] palpitations [ ] murmur  Resp: [ ] negative [ ] cough [ ] shortness of breath [ ] dyspnea [ ] wheezing [ ] sputum [ ] hemoptysis  GI: [ ] negative [ ] nausea [ ] vomiting [ ] diarrhea [ ] constipation [ ] abd pain [ ] dysphagia   : [ ] negative [ ] dysuria [ ] nocturia [ ] hematuria [ ] increased urinary frequency  Musculoskeletal: [ ] negative [ ] back pain [ ] myalgias [ ] arthralgias [ ] fracture  Skin: [ ] negative [ ] rash [ ] itch  Neurological: [ ] negative [ ] headache [ ] dizziness [ ] syncope [ ] weakness [ ] numbness  Psychiatric: [ ] negative [ ] anxiety [ ] depression  Endocrine: [ ] negative [ ] diabetes [ ] thyroid problem  Hematologic/Lymphatic: [ ] negative [ ] anemia [ ] bleeding problem  Allergic/Immunologic: [ ] negative [ ] itchy eyes [ ] nasal discharge [ ] hives [ ] angioedema  [ ] All other systems negative  [ ] Unable to assess ROS because ________    OBJECTIVE:  ICU Vital Signs Last 24 Hrs  T(C): --  T(F): --  HR: --  BP: --  BP(mean): --  ABP: --  ABP(mean): --  RR: --  SpO2: --          CAPILLARY BLOOD GLUCOSE          PHYSICAL EXAM:  Gen: NAD, WD, WN  HEENT: MMM, PERRL, EOMI  Neck: No JVP elevation  CV: regular  Lung: CTAB  Abd: Soft, NT, ND  Ext: WWP, no CCE  Skin: No rash  Neuro: A&Ox3, no focal deficits  LINES:     HOSPITAL MEDICATIONS:  Standing Meds:  allopurinol 300 milliGRAM(s) Oral daily  lactated ringers. 1000 milliLiter(s) IV Continuous <Continuous>      PRN Meds:  acetaminophen     Tablet .. 650 milliGRAM(s) Oral every 6 hours PRN  melatonin 3 milliGRAM(s) Oral at bedtime PRN      LABS:                        11.7   18.20 )-----------( 417      ( 17 Oct 2023 04:55 )             36.5     Hgb Trend: 11.7<--, 13.6<--  10-17    140  |  105  |  7   ----------------------------<  100<H>  3.8   |  23  |  0.87    Ca    9.3      17 Oct 2023 04:55  Phos  4.2     10-17  Mg     2.20     10-17    TPro  8.2  /  Alb  3.9  /  TBili  0.5  /  DBili  x   /  AST  7   /  ALT  12  /  AlkPhos  143<H>  10-17    Creatinine Trend: 0.87<--, 0.94<--, 0.83<--  PT/INR - ( 17 Oct 2023 04:55 )   PT: 12.9 sec;   INR: 1.15 ratio         PTT - ( 17 Oct 2023 04:55 )  PTT:32.1 sec  Urinalysis Basic - ( 17 Oct 2023 04:55 )    Color: x / Appearance: x / SG: x / pH: x  Gluc: 100 mg/dL / Ketone: x  / Bili: x / Urobili: x   Blood: x / Protein: x / Nitrite: x   Leuk Esterase: x / RBC: x / WBC x   Sq Epi: x / Non Sq Epi: x / Bacteria: x        Venous Blood Gas:  10-16 @ 22:04  7.38/45/53/27/83.7  VBG Lactate: 1.1      MICROBIOLOGY:       RADIOLOGY:  [ ] Reviewed and interpreted by me    PULMONARY FUNCTION TESTS:    EKG:   CHIEF COMPLAINT: cough     HPI:    Mr. Gaitan is a 28 year old M w/ recent findings of mediastinal mass with biopsy who presents for workup given recent inconclusive biopsy results. He was initially sent by his PCP to ED for chest pain 3 weeks ago, when patient was admitted at [facility] for workup at which point imaging was performed that demonstrated bulky mediastinal lymphadenopathy that was reportedly biopsied followed by a requested for additional tissue for further characterization.  Interventional pulmonology has been consulted for further diagnostic evaluation.    On exam patient endorses some mild persistent cough but otherwise denies significant shortness of breath.          PAST MEDICAL & SURGICAL HISTORY:  No pertinent past medical history      No significant past surgical history          FAMILY HISTORY:  No pertinent family history in first degree relatives        SOCIAL HISTORY:  Smoking: [x ] Never Smoked [ ] Former Smoker (__ packs x ___ years) [ ] Current Smoker  (__ packs x ___ years)  Substance Use: [ ] Never Used [ ] Used ____  EtOH Use:  Marital Status: [ ] Single [ ]  [ ]  [ ]   Sexual History:   Occupation:  Recent Travel:  Country of Birth:  Advance Directives:    Allergies    No Known Allergies    Intolerances        HOME MEDICATIONS:  Home Medications:      REVIEW OF SYSTEMS:  Constitutional: [ ] negative [ ] fevers [ ] chills [ ] weight loss [ ] weight gain  HEENT: [ ] negative [ ] dry eyes [ ] eye irritation [ ] postnasal drip [ ] nasal congestion  CV: [ ] negative  [ ] chest pain [ ] orthopnea [ ] palpitations [ ] murmur  Resp: [ ] negative [ ] cough [ ] shortness of breath [ ] dyspnea [ ] wheezing [ ] sputum [ ] hemoptysis  GI: [ ] negative [ ] nausea [ ] vomiting [ ] diarrhea [ ] constipation [ ] abd pain [ ] dysphagia   : [ ] negative [ ] dysuria [ ] nocturia [ ] hematuria [ ] increased urinary frequency  Musculoskeletal: [ ] negative [ ] back pain [ ] myalgias [ ] arthralgias [ ] fracture  Skin: [ ] negative [ ] rash [ ] itch  Neurological: [ ] negative [ ] headache [ ] dizziness [ ] syncope [ ] weakness [ ] numbness  Psychiatric: [ ] negative [ ] anxiety [ ] depression  Endocrine: [ ] negative [ ] diabetes [ ] thyroid problem  Hematologic/Lymphatic: [ ] negative [ ] anemia [ ] bleeding problem  Allergic/Immunologic: [ ] negative [ ] itchy eyes [ ] nasal discharge [ ] hives [ ] angioedema  [x ] All other systems negative  [ ] Unable to assess ROS because ________    OBJECTIVE:  ICU Vital Signs Last 24 Hrs  T(C): --  T(F): --  HR: --  BP: --  BP(mean): --  ABP: --  ABP(mean): --  RR: --  SpO2: --          CAPILLARY BLOOD GLUCOSE          PHYSICAL EXAM:  Gen: NAD, WD, WN  HEENT: atraumatic, normocephalic   CV: regular rate/rhythm, no rubs, gallops, murmurs   Lung: CTAB  Abd: Soft, NT, ND  Ext: no edema  Skin: No rash, cyanosis   Neuro: no gross/focal deficits     LINES:     HOSPITAL MEDICATIONS:  Standing Meds:  allopurinol 300 milliGRAM(s) Oral daily  lactated ringers. 1000 milliLiter(s) IV Continuous <Continuous>      PRN Meds:  acetaminophen     Tablet .. 650 milliGRAM(s) Oral every 6 hours PRN  melatonin 3 milliGRAM(s) Oral at bedtime PRN      LABS:                        11.7   18.20 )-----------( 417      ( 17 Oct 2023 04:55 )             36.5     Hgb Trend: 11.7<--, 13.6<--  10-17    140  |  105  |  7   ----------------------------<  100<H>  3.8   |  23  |  0.87    Ca    9.3      17 Oct 2023 04:55  Phos  4.2     10-17  Mg     2.20     10-17    TPro  8.2  /  Alb  3.9  /  TBili  0.5  /  DBili  x   /  AST  7   /  ALT  12  /  AlkPhos  143<H>  10-17    Creatinine Trend: 0.87<--, 0.94<--, 0.83<--  PT/INR - ( 17 Oct 2023 04:55 )   PT: 12.9 sec;   INR: 1.15 ratio         PTT - ( 17 Oct 2023 04:55 )  PTT:32.1 sec  Urinalysis Basic - ( 17 Oct 2023 04:55 )    Color: x / Appearance: x / SG: x / pH: x  Gluc: 100 mg/dL / Ketone: x  / Bili: x / Urobili: x   Blood: x / Protein: x / Nitrite: x   Leuk Esterase: x / RBC: x / WBC x   Sq Epi: x / Non Sq Epi: x / Bacteria: x        Venous Blood Gas:  10-16 @ 22:04  7.38/45/53/27/83.7  VBG Lactate: 1.1      MICROBIOLOGY:       RADIOLOGY:  [ ] Reviewed and interpreted by me    PULMONARY FUNCTION TESTS:    EKG:

## 2023-10-18 NOTE — DIETITIAN INITIAL EVALUATION ADULT - OTHER INFO
Patient is currently NPO for procedure, previously ordered for a regular diet. Patient expressed a continued feeling of a poor appetite during course of admission. Amenable to receive an oral nutrition supplement to support PO intake. Patient denies any chewing or swallowing difficulty with regular solids or thin liquids. No report of GI distress (nausea, vomiting, diarrhea, constipation). Last BM 10/18/23 per patient report. No BMs noted per RN flowsheet documentation. Not noted to be on a bowel regimen.    Writer provided verbal education regarding current diet order and nutrition recommendations for after discharge. Recommendations included strategies to help promote PO intake and the importance of consuming adequate kcal/protein. Patient verbalized understanding to the discussion.

## 2023-10-18 NOTE — DIETITIAN INITIAL EVALUATION ADULT - ADD RECOMMEND
1. Please obtain height and weight measurements for current admission   2. Monitor weights, labs, BM's, skin integrity, p.o. intake.   3. Please monitor % PO intake on flowsheets   4. Honor food preferences as able within therapeutic diet order.

## 2023-10-18 NOTE — ASU PATIENT PROFILE, ADULT - FALL HARM RISK - UNIVERSAL INTERVENTIONS
Bed in lowest position, wheels locked, appropriate side rails in place/Call bell, personal items and telephone in reach/Instruct patient to call for assistance before getting out of bed or chair/Non-slip footwear when patient is out of bed/[place] to call system/Physically safe environment - no spills, clutter or unnecessary equipment/Purposeful Proactive Rounding/Room/bathroom lighting operational, light cord in reach

## 2023-10-18 NOTE — DIETITIAN INITIAL EVALUATION ADULT - PERTINENT LABORATORY DATA
10-18    137  |  101  |  7   ----------------------------<  86  3.9   |  24  |  0.76    Ca    9.3      18 Oct 2023 05:40  Phos  4.2     10-18  Mg     2.20     10-18    TPro  7.9  /  Alb  3.9  /  TBili  0.3  /  DBili  x   /  AST  14  /  ALT  14  /  AlkPhos  151<H>  10-18

## 2023-10-18 NOTE — CONSULT NOTE ADULT - TIME BILLING
time spent in review of laboratory data, radiology images and results, discussion with primary team/patient, and monitoring for potential decompensation. Interventions performed as documented above. In addition, time also spent to risks and benefits of the procedure, alternative procedures, diagnostic and therapeutic outcomes as well as further management plan. Complex patient requiring services. Interventional Pulmonology services provided to the patient were separate from general pulmonary service due to complexity of issues and interventions required. Time spent separate from time spent doing any procedures.
Review of medical records, labs, imaging, coordination of care and did not include time spent teaching.

## 2023-10-18 NOTE — CONSULT NOTE ADULT - ASSESSMENT
Mr. Gaitan is a 28 year old M w/ recent findings of mediastinal mass with biopsy who presents for workup given recent inconclusive biopsy results. He was initially sent by his PCP to ED for chest pain 3 weeks ago, when patient was admitted at [facility] for workup at which point imaging was performed that demonstrated bulky mediastinal lymphadenopathy that was reportedly biopsied followed by a requested for additional tissue for further characterization.  Interventional pulmonology has been consulted for further diagnostic evaluation.    #Mediastinal Lymphadenopathy  - patient w/reported previous imaging at OSH demonstrating bulky mediastinal LAD, CTA on 10/16/23 at [facility] demonstrating large anterior mediastinal node as well as large R paratracheal node   - will plan for bronchoscopy, EBUS, with lymph node biopsy as below per interventional pulmonology    Recommendations:  - continue NPO  - continue to withhold anticoagulation  - will plan for bronchoscopy with EBUS, lymph node biopsy today (10/18/23)  - refer to procedure note for further details post procedure    Patient discussed w/Dr. Panda Ramos PGY6  [id number] Mr. Gaitan is a 28 year old M w/ recent findings of mediastinal mass with biopsy who presents for workup given recent inconclusive biopsy results. He was initially sent by his PCP to ED for chest pain 3 weeks ago, when patient was admitted at [facility] for workup at which point imaging was performed that demonstrated bulky mediastinal lymphadenopathy that was reportedly biopsied followed by a requested for additional tissue for further characterization.  Interventional pulmonology has been consulted for further diagnostic evaluation.    #Mediastinal Lymphadenopathy  - patient w/reported previous imaging at OSH demonstrating bulky mediastinal LAD, CTA on 10/16/23 at [facility] demonstrating large anterior mediastinal node as well as large R paratracheal node   - will plan for bronchoscopy, EBUS, with lymph node biopsy as below per interventional pulmonology    Recommendations:  - continue NPO  - continue to withhold anticoagulation  - will plan for bronchoscopy with EBUS, lymph node biopsy today (10/18/23), further recommendations to follow   - refer to procedure note for further details post procedure    Patient discussed w/Dr. Panda Ramos PGY6  [id number]

## 2023-10-18 NOTE — DIETITIAN INITIAL EVALUATION ADULT - REASON FOR ADMISSION
Other diseases of mediastinum    Patient is a 28y Male with no pertinent past medical history who presents to [facility] with expedited workup given recent inconclusive biopsy results

## 2023-10-18 NOTE — DIETITIAN INITIAL EVALUATION ADULT - PROBLEM SELECTOR PLAN 1
-currently does not meet criteria for adalgisa-lay criteria-1/4, continue to monitor   - daily TLS labs (uric acid, LDH, Phos, CMP), daily DIC labs (PT, INR, PTT, D-dimer) daily  - Continue IV hydration at 100 cc/hour  - ordered allopurinol 300 mg daily.  will consider rasburicase if uric acid > 8, but will hold for now until G6PD level  checked (ordered for this AM) prior to giving rasburicase.   -IR consult in AM -for core biopsy with IR or EBUS depending on location of lymph node (either mediastinal conglomerate, or more likely right paratracheal lymph node measuring 3.4 cm).   -heme to send peripheral flow cytometry and to review peripheral smear  -will workup for other types of mediastinal masses: AFB, HCG, TSH  -Hematology consulted, formal consult in AM, appreciate recs

## 2023-10-18 NOTE — CONSULT NOTE ADULT - ATTENDING COMMENTS
-----------------------------------------------------------------------------  Patient seen and examined on rounds with heme/onc fellows Dr. Jackson and Dr. Lorenzana  29 yo male with recurrent left shoulder pain and intermittent chest tightness, seen at [facility] on 10/4/23 after several weeks of symptoms including about 6-7 lb weight loss in about a month; imaging with CT and QHC showed mediastinal mass but per patient trans-bronchial FNA biopsy 10/4/34 was inconclusive. Peripheral smear review did reveal some unusual appearing white blood cells that could indicate plasma cells in the circulation; SPEP, LIBAN, K/L ordered and sent from [facility]   - interventional pulmonary consult appreciated; please note that for lymphoma diagnosis, core biopsy is needed to evaluate for structural integrity and abnormalities of the lymph node  - solid malignancy diagnosis is also a possibility  - will follow awaiting final pathology; in meantime, LDH, Uric acid, all trans retanoid acid  - will follow
29 yo M p/w chest pain, known anterior mediastinal mass s/p transthoracic biopsy 2 weeks ago at [facility] that was nondiagnostic.  CTA here showing anterior med node measuring 6.6 cm x 6.2 cm and right lower paratracheal node measures 3.4 cm x 2.6 cm. Findings suspicious for lymphoma.  Plan for EBUS on 10/18/23. NPO PN.  Plan discussed with patient and mother at bedside.    Pooja Jain MD
27 yo M p/w chest pain, known anterior mediastinal mass s/p transthoracic biopsy 2 weeks ago at [facility] that was nondiagnostic.  CTA here showing anterior med node measuring 6.6 cm x 6.2 cm and right lower paratracheal node measures 3.4 cm x 2.6 cm. Findings suspicious for lymphoma.  Plan for EBUS with FNB/FNA/Cryo assisted biopsy. Risks and benefits discussed.     Risks of the procedure including but not limited to pneumothorax and bleeding discussed, and possible interventions in case those are noted also discussed extensively. Alternative modalities of sampling as well as option to pursue surveillance imaging also discussed. After thorough risk benefit discussion and answering all questions related to the procedure, patient is agreeable to proceed with proposed intervention.

## 2023-10-18 NOTE — DIETITIAN INITIAL EVALUATION ADULT - NS FNS DIET ORDER
Diet, NPO after Midnight:      NPO Start Date: 17-Oct-2023,   NPO Start Time: 23:59 (10-17-23 @ 17:52)

## 2023-10-18 NOTE — DIETITIAN INITIAL EVALUATION ADULT - ORAL NUTRITION SUPPLEMENTS
As medically feasible pending resumption of PO diet, recommend Ensure Plus High Protein (provides 350kcal, 20gms protein per serving) BID, prefers chocolate flavor

## 2023-10-18 NOTE — DIETITIAN INITIAL EVALUATION ADULT - PERTINENT MEDS FT
MEDICATIONS  (STANDING):  allopurinol 300 milliGRAM(s) Oral daily  lactated ringers. 1000 milliLiter(s) (100 mL/Hr) IV Continuous <Continuous>    MEDICATIONS  (PRN):  acetaminophen     Tablet .. 650 milliGRAM(s) Oral every 6 hours PRN Mild Pain (1 - 3)  melatonin 3 milliGRAM(s) Oral at bedtime PRN Insomnia

## 2023-10-19 ENCOUNTER — TRANSCRIPTION ENCOUNTER (OUTPATIENT)
Age: 28
End: 2023-10-19

## 2023-10-19 PROBLEM — Z00.00 ENCOUNTER FOR PREVENTIVE HEALTH EXAMINATION: Status: ACTIVE | Noted: 2023-10-19

## 2023-10-19 LAB
ALBUMIN SERPL ELPH-MCNC: 4 G/DL — SIGNIFICANT CHANGE UP (ref 3.3–5)
ALBUMIN SERPL ELPH-MCNC: 4 G/DL — SIGNIFICANT CHANGE UP (ref 3.3–5)
ALP SERPL-CCNC: 148 U/L — HIGH (ref 40–120)
ALP SERPL-CCNC: 148 U/L — HIGH (ref 40–120)
ALT FLD-CCNC: 12 U/L — SIGNIFICANT CHANGE UP (ref 4–41)
ALT FLD-CCNC: 12 U/L — SIGNIFICANT CHANGE UP (ref 4–41)
ANION GAP SERPL CALC-SCNC: 13 MMOL/L — SIGNIFICANT CHANGE UP (ref 7–14)
ANION GAP SERPL CALC-SCNC: 13 MMOL/L — SIGNIFICANT CHANGE UP (ref 7–14)
APTT BLD: 32.1 SEC — SIGNIFICANT CHANGE UP (ref 24.5–35.6)
APTT BLD: 32.1 SEC — SIGNIFICANT CHANGE UP (ref 24.5–35.6)
AST SERPL-CCNC: 9 U/L — SIGNIFICANT CHANGE UP (ref 4–40)
AST SERPL-CCNC: 9 U/L — SIGNIFICANT CHANGE UP (ref 4–40)
BASOPHILS # BLD AUTO: 0.05 K/UL — SIGNIFICANT CHANGE UP (ref 0–0.2)
BASOPHILS # BLD AUTO: 0.05 K/UL — SIGNIFICANT CHANGE UP (ref 0–0.2)
BASOPHILS NFR BLD AUTO: 0.3 % — SIGNIFICANT CHANGE UP (ref 0–2)
BASOPHILS NFR BLD AUTO: 0.3 % — SIGNIFICANT CHANGE UP (ref 0–2)
BILIRUB SERPL-MCNC: 0.4 MG/DL — SIGNIFICANT CHANGE UP (ref 0.2–1.2)
BILIRUB SERPL-MCNC: 0.4 MG/DL — SIGNIFICANT CHANGE UP (ref 0.2–1.2)
BUN SERPL-MCNC: 7 MG/DL — SIGNIFICANT CHANGE UP (ref 7–23)
BUN SERPL-MCNC: 7 MG/DL — SIGNIFICANT CHANGE UP (ref 7–23)
CALCIUM SERPL-MCNC: 9.6 MG/DL — SIGNIFICANT CHANGE UP (ref 8.4–10.5)
CALCIUM SERPL-MCNC: 9.6 MG/DL — SIGNIFICANT CHANGE UP (ref 8.4–10.5)
CHLORIDE SERPL-SCNC: 101 MMOL/L — SIGNIFICANT CHANGE UP (ref 98–107)
CHLORIDE SERPL-SCNC: 101 MMOL/L — SIGNIFICANT CHANGE UP (ref 98–107)
CO2 SERPL-SCNC: 25 MMOL/L — SIGNIFICANT CHANGE UP (ref 22–31)
CO2 SERPL-SCNC: 25 MMOL/L — SIGNIFICANT CHANGE UP (ref 22–31)
CREAT SERPL-MCNC: 0.79 MG/DL — SIGNIFICANT CHANGE UP (ref 0.5–1.3)
CREAT SERPL-MCNC: 0.79 MG/DL — SIGNIFICANT CHANGE UP (ref 0.5–1.3)
D DIMER BLD IA.RAPID-MCNC: 291 NG/ML DDU — HIGH
D DIMER BLD IA.RAPID-MCNC: 291 NG/ML DDU — HIGH
EGFR: 126 ML/MIN/1.73M2 — SIGNIFICANT CHANGE UP
EGFR: 126 ML/MIN/1.73M2 — SIGNIFICANT CHANGE UP
EOSINOPHIL # BLD AUTO: 0.02 K/UL — SIGNIFICANT CHANGE UP (ref 0–0.5)
EOSINOPHIL # BLD AUTO: 0.02 K/UL — SIGNIFICANT CHANGE UP (ref 0–0.5)
EOSINOPHIL NFR BLD AUTO: 0.1 % — SIGNIFICANT CHANGE UP (ref 0–6)
EOSINOPHIL NFR BLD AUTO: 0.1 % — SIGNIFICANT CHANGE UP (ref 0–6)
GLUCOSE SERPL-MCNC: 90 MG/DL — SIGNIFICANT CHANGE UP (ref 70–99)
GLUCOSE SERPL-MCNC: 90 MG/DL — SIGNIFICANT CHANGE UP (ref 70–99)
GRAM STN FLD: SIGNIFICANT CHANGE UP
GRAM STN FLD: SIGNIFICANT CHANGE UP
HCT VFR BLD CALC: 36.9 % — LOW (ref 39–50)
HCT VFR BLD CALC: 36.9 % — LOW (ref 39–50)
HGB BLD-MCNC: 12.2 G/DL — LOW (ref 13–17)
HGB BLD-MCNC: 12.2 G/DL — LOW (ref 13–17)
IANC: 17.14 K/UL — HIGH (ref 1.8–7.4)
IANC: 17.14 K/UL — HIGH (ref 1.8–7.4)
IMM GRANULOCYTES NFR BLD AUTO: 0.8 % — SIGNIFICANT CHANGE UP (ref 0–0.9)
IMM GRANULOCYTES NFR BLD AUTO: 0.8 % — SIGNIFICANT CHANGE UP (ref 0–0.9)
INR BLD: 1.14 RATIO — SIGNIFICANT CHANGE UP (ref 0.85–1.18)
INR BLD: 1.14 RATIO — SIGNIFICANT CHANGE UP (ref 0.85–1.18)
LDH SERPL L TO P-CCNC: 115 U/L — LOW (ref 135–225)
LDH SERPL L TO P-CCNC: 115 U/L — LOW (ref 135–225)
LYMPHOCYTES # BLD AUTO: 1.39 K/UL — SIGNIFICANT CHANGE UP (ref 1–3.3)
LYMPHOCYTES # BLD AUTO: 1.39 K/UL — SIGNIFICANT CHANGE UP (ref 1–3.3)
LYMPHOCYTES # BLD AUTO: 7.2 % — LOW (ref 13–44)
LYMPHOCYTES # BLD AUTO: 7.2 % — LOW (ref 13–44)
MAGNESIUM SERPL-MCNC: 2.2 MG/DL — SIGNIFICANT CHANGE UP (ref 1.6–2.6)
MAGNESIUM SERPL-MCNC: 2.2 MG/DL — SIGNIFICANT CHANGE UP (ref 1.6–2.6)
MCHC RBC-ENTMCNC: 26.6 PG — LOW (ref 27–34)
MCHC RBC-ENTMCNC: 26.6 PG — LOW (ref 27–34)
MCHC RBC-ENTMCNC: 33.1 GM/DL — SIGNIFICANT CHANGE UP (ref 32–36)
MCHC RBC-ENTMCNC: 33.1 GM/DL — SIGNIFICANT CHANGE UP (ref 32–36)
MCV RBC AUTO: 80.6 FL — SIGNIFICANT CHANGE UP (ref 80–100)
MCV RBC AUTO: 80.6 FL — SIGNIFICANT CHANGE UP (ref 80–100)
MONOCYTES # BLD AUTO: 0.59 K/UL — SIGNIFICANT CHANGE UP (ref 0–0.9)
MONOCYTES # BLD AUTO: 0.59 K/UL — SIGNIFICANT CHANGE UP (ref 0–0.9)
MONOCYTES NFR BLD AUTO: 3.1 % — SIGNIFICANT CHANGE UP (ref 2–14)
MONOCYTES NFR BLD AUTO: 3.1 % — SIGNIFICANT CHANGE UP (ref 2–14)
NEUTROPHILS # BLD AUTO: 17.14 K/UL — HIGH (ref 1.8–7.4)
NEUTROPHILS # BLD AUTO: 17.14 K/UL — HIGH (ref 1.8–7.4)
NEUTROPHILS NFR BLD AUTO: 88.5 % — HIGH (ref 43–77)
NEUTROPHILS NFR BLD AUTO: 88.5 % — HIGH (ref 43–77)
NIGHT BLUE STAIN TISS: SIGNIFICANT CHANGE UP
NIGHT BLUE STAIN TISS: SIGNIFICANT CHANGE UP
NRBC # BLD: 0 /100 WBCS — SIGNIFICANT CHANGE UP (ref 0–0)
NRBC # BLD: 0 /100 WBCS — SIGNIFICANT CHANGE UP (ref 0–0)
NRBC # FLD: 0 K/UL — SIGNIFICANT CHANGE UP (ref 0–0)
NRBC # FLD: 0 K/UL — SIGNIFICANT CHANGE UP (ref 0–0)
PHOSPHATE SERPL-MCNC: 4.6 MG/DL — HIGH (ref 2.5–4.5)
PHOSPHATE SERPL-MCNC: 4.6 MG/DL — HIGH (ref 2.5–4.5)
PLATELET # BLD AUTO: 452 K/UL — HIGH (ref 150–400)
PLATELET # BLD AUTO: 452 K/UL — HIGH (ref 150–400)
POTASSIUM SERPL-MCNC: 3.9 MMOL/L — SIGNIFICANT CHANGE UP (ref 3.5–5.3)
POTASSIUM SERPL-MCNC: 3.9 MMOL/L — SIGNIFICANT CHANGE UP (ref 3.5–5.3)
POTASSIUM SERPL-SCNC: 3.9 MMOL/L — SIGNIFICANT CHANGE UP (ref 3.5–5.3)
POTASSIUM SERPL-SCNC: 3.9 MMOL/L — SIGNIFICANT CHANGE UP (ref 3.5–5.3)
PROT SERPL-MCNC: 8 G/DL — SIGNIFICANT CHANGE UP (ref 6–8.3)
PROT SERPL-MCNC: 8 G/DL — SIGNIFICANT CHANGE UP (ref 6–8.3)
PROTHROM AB SERPL-ACNC: 12.8 SEC — SIGNIFICANT CHANGE UP (ref 9.5–13)
PROTHROM AB SERPL-ACNC: 12.8 SEC — SIGNIFICANT CHANGE UP (ref 9.5–13)
RBC # BLD: 4.58 M/UL — SIGNIFICANT CHANGE UP (ref 4.2–5.8)
RBC # BLD: 4.58 M/UL — SIGNIFICANT CHANGE UP (ref 4.2–5.8)
RBC # FLD: 14.1 % — SIGNIFICANT CHANGE UP (ref 10.3–14.5)
RBC # FLD: 14.1 % — SIGNIFICANT CHANGE UP (ref 10.3–14.5)
SODIUM SERPL-SCNC: 139 MMOL/L — SIGNIFICANT CHANGE UP (ref 135–145)
SODIUM SERPL-SCNC: 139 MMOL/L — SIGNIFICANT CHANGE UP (ref 135–145)
SPECIMEN SOURCE: SIGNIFICANT CHANGE UP
TM INTERPRETATION: SIGNIFICANT CHANGE UP
TM INTERPRETATION: SIGNIFICANT CHANGE UP
URATE SERPL-MCNC: 5.1 MG/DL — SIGNIFICANT CHANGE UP (ref 3.4–8.8)
URATE SERPL-MCNC: 5.1 MG/DL — SIGNIFICANT CHANGE UP (ref 3.4–8.8)
WBC # BLD: 19.34 K/UL — HIGH (ref 3.8–10.5)
WBC # BLD: 19.34 K/UL — HIGH (ref 3.8–10.5)
WBC # FLD AUTO: 19.34 K/UL — HIGH (ref 3.8–10.5)
WBC # FLD AUTO: 19.34 K/UL — HIGH (ref 3.8–10.5)

## 2023-10-19 PROCEDURE — 99232 SBSQ HOSP IP/OBS MODERATE 35: CPT

## 2023-10-19 PROCEDURE — 99233 SBSQ HOSP IP/OBS HIGH 50: CPT | Mod: GC

## 2023-10-19 RX ORDER — BENZOCAINE AND MENTHOL 5; 1 G/100ML; G/100ML
1 LIQUID ORAL
Refills: 0 | Status: DISCONTINUED | OUTPATIENT
Start: 2023-10-19 | End: 2023-10-20

## 2023-10-19 RX ADMIN — BENZOCAINE AND MENTHOL 1 LOZENGE: 5; 1 LIQUID ORAL at 17:55

## 2023-10-19 RX ADMIN — Medication 300 MILLIGRAM(S): at 11:44

## 2023-10-19 RX ADMIN — BENZOCAINE AND MENTHOL 1 LOZENGE: 5; 1 LIQUID ORAL at 11:48

## 2023-10-19 NOTE — PROGRESS NOTE ADULT - ATTENDING SUPERVISION STATEMENT
Gynecology Surgical Discharge Summary     Name: Lachelle Granger MRN: [id number]  SSN: [id number]    YOB: 1950  Age: 81 y.o. Sex: female      Admit date: 9/14/2023    Discharge Date: 9/14/2023      Attending Physician: Kayla Madrigal MD     Admission Diagnoses: Adnexal mass [N94.89]    Discharge Diagnoses: Adnexal mass [N94.89]   Principal Problem:    Adnexal mass  Resolved Problems:    * No resolved hospital problems. *       Procedures: Procedure(s):  DAVINCI RIGHT OVARIAN CYSTECTOMY POSSIBLE RIGHT SALPINGO-OOPHORECTOMY    Hospital Course: Normal hospital course for this procedure. The patient was released to her home in good condition. Significant Diagnostic Studies: No results found for this or any previous visit (from the past 24 hour(s)). Patient Instructions:     Diet, activity, wound care: See printed instructions. Current Discharge Medication List        START taking these medications    Details   HYDROcodone-acetaminophen (NORCO) 5-325 MG per tablet Take 1 tablet by mouth every 6 hours as needed for Pain for up to 7 days.  Max Daily Amount: 4 tablets  Qty: 20 tablet, Refills: 0  Start date: 9/14/2023, End date: 9/21/2023    Comments: Reduce doses taken as pain becomes manageable Reduce doses taken as pain becomes manageable  Associated Diagnoses: Post-op pain      ibuprofen (ADVIL;MOTRIN) 800 MG tablet Take 1 tablet by mouth every 8 hours as needed for Pain  Qty: 60 tablet, Refills: 1  Start date: 9/14/2023, End date: 10/14/2023           CONTINUE these medications which have NOT CHANGED    Details   Multiple Vitamins-Minerals (EYE VITAMINS PO) Take 1 tablet by mouth Daily Occuvite 50plus      magnesium oxide (MAG-OX) 400 (240 Mg) MG tablet Take 1 tablet by mouth daily      Multiple Vitamins-Minerals (THERAPEUTIC MULTIVITAMIN-MINERALS) tablet Take 1 tablet by mouth daily Hair skin and nails      psyllium (METAMUCIL) 58.6 % packet Take 1 packet by mouth daily metamucil
Fellow

## 2023-10-19 NOTE — DISCHARGE NOTE PROVIDER - NSDCCPTREATMENT_GEN_ALL_CORE_FT
PRINCIPAL PROCEDURE  Procedure: CT chest w con  Findings and Treatment: FINDINGS:  CHEST:  PULMONARY ANGIOGRAM: No pulmonary embolism  LUNGS AND LARGE AIRWAYS: Patent central airways. No pulmonary nodules.  PLEURA: No pleural effusion.  VESSELS: Within normal limits.  HEART: Heart size is normal. No pericardial effusion.  MEDIASTINUM AND GWENDOLYN: Conglomerate multistation mediastinal   lymphadenopathy with a dominant anterior mediastinal node measuring 6.6   cm x 6.2 cm. Reference right lower paratracheal node measures 3.4 cm x   2.6 cm.  CHEST WALL AND LOWER NECK: Within normal limits.  ABDOMEN AND PELVIS:  LIVER: Normal in contour  BILE DUCTS: Normal caliber.  GALLBLADDER: Within normal limits.  SPLEEN: Normal size.  PANCREAS: Within normal limits.  ADRENALS: Within normal limits.  KIDNEYS/URETERS: Within normal limits.  BLADDER: Within normal limits.  REPRODUCTIVE ORGANS: Prostate within normal limits.  BOWEL:No bowel obstruction. Appendix is normal.  PERITONEUM: No ascites.  VESSELS: Within normal limits.  RETROPERITONEUM/LYMPH NODES: No lymphadenopathy.  ABDOMINAL WALL: Within normal limits.  BONES: Within normal limits.  IMPRESSION:  No pulmonary embolism.  Bulky mediastinal lymphadenopathy with a dominant anterior mediastinal   node measuring 6.6 cm. Findings consistent with neoplasm.  No acute findings in the abdomen or pelvis.        SECONDARY PROCEDURE  Procedure: CT abdomen and pelvis  Findings and Treatment: ABDOMEN AND PELVIS:  LIVER: Normal in contour  BILE DUCTS: Normal caliber.  GALLBLADDER: Within normal limits.  SPLEEN: Normal size.  PANCREAS: Within normal limits.  ADRENALS: Within normal limits.  KIDNEYS/URETERS: Within normal limits.  BLADDER: Within normal limits.  REPRODUCTIVE ORGANS: Prostate within normal limits.  BOWEL:No bowel obstruction. Appendix is normal.  PERITONEUM: No ascites.  VESSELS: Within normal limits.  RETROPERITONEUM/LYMPH NODES: No lymphadenopathy.  ABDOMINAL WALL: Within normal limits.  BONES: Within normal limits.  IMPRESSION:  No pulmonary embolism.  Bulky mediastinal lymphadenopathy with a dominant anterior mediastinal   node measuring 6.6 cm. Findings consistent with neoplasm.  No acute findings in the abdomen or pelvis.

## 2023-10-19 NOTE — PROGRESS NOTE ADULT - ATTENDING COMMENTS
29 yo M p/w chest pain, known anterior mediastinal mass s/p transthoracic biopsy 2 weeks ago at [facility] that was nondiagnostic.  CTA here showing anterior med node measuring 6.6 cm x 6.2 cm and right lower paratracheal node measures 3.4 cm x 2.6 cm. Findings suspicious for lymphoma.  s/p EBUS on 10/18/23 with successful biopsy of right lower paratracheal node.  Some flecks of blood with cough overnight and this AM but otherwise no complaints.  No PUlmonary CI to discharge.  Can f/u with Dr. Mosqueda as already scheduled on 10/30/23.    Pooja Jain MD

## 2023-10-19 NOTE — DISCHARGE NOTE PROVIDER - NSFOLLOWUPCLINICS_GEN_ALL_ED_FT
[facility]  Hematology/Oncology  [address] [id number]  Phone: [phone]  Fax:

## 2023-10-19 NOTE — DISCHARGE NOTE PROVIDER - PROVIDER TOKENS
PROVIDER:[TOKEN:[[id number]],SCHEDULEDAPPT:[10/30/2023],SCHEDULEDAPPTTIME:[02:00 PM]] PROVIDER:[TOKEN:[[id number]],SCHEDULEDAPPT:[10/30/2023],SCHEDULEDAPPTTIME:[02:00 PM]],PROVIDER:[TOKEN:[[id number]],FOLLOWUP:[1-3 days],ESTABLISHEDPATIENT:[T]]

## 2023-10-19 NOTE — DISCHARGE NOTE PROVIDER - NSDCCPCAREPLAN_GEN_ALL_CORE_FT
PRINCIPAL DISCHARGE DIAGNOSIS  Diagnosis: Mediastinal lymphadenopathy  Assessment and Plan of Treatment: You underwent bronchoscopy with biopsy of mediastinal lymph node. Awaiting pathology results. Follow up with Hematology and Pulmonary in 2 weeks.      SECONDARY DISCHARGE DIAGNOSES  Diagnosis: Tumor lysis syndrome  Assessment and Plan of Treatment: Uric acid was elevated on admission and there was concern for mild tumor lysis syndrome. Continue Allopurinol.     PRINCIPAL DISCHARGE DIAGNOSIS  Diagnosis: Mediastinal lymphadenopathy  Assessment and Plan of Treatment: You were diagnosed with mass in your chest and underwent bronchoscopy with biopsy of mediastinal lymph node.   Awaiting pathology results and Hematology/Oncology would like to wait for pathology results to ensure biopsy sample is sufficient and to diagnose correctly in order to treat you appropriately.   You are signing out against medical advice and choosing to leave without waiting for pathology results in order to diagnose.   You will need to follow up with PCP within 1-3 days and repeat blood work including CBC/CMP, tumor lysis syndrome labs.   You will also need to follow up with Hematology and Pulmonary within one week. Please call the phone numbers attached to schedule an appointment.   Please go to the nearest ED if you develop worsening shortness of breath, chest pain, difficulty breathing, dizziness      SECONDARY DISCHARGE DIAGNOSES  Diagnosis: Tumor lysis syndrome  Assessment and Plan of Treatment: Uric acid was elevated on admission and there was concern for mild tumor lysis syndrome. Continue Allopurinol.  You will need to follow up with PCP within 1-3 days and repeat blood work including CBC/CMP, tumor lysis syndrome labs.   You will also need to follow up with Hematology and Pulmonary within one week. Please call the phone numbers attached to schedule an appointment.

## 2023-10-19 NOTE — DISCHARGE NOTE PROVIDER - HOSPITAL COURSE
28 year old M w/ recent findings of mediastinal mass with inconclusive biopsy who presents for expedited workup     ·  Problem: Mediastinal lymphadenopathy.   ·  Plan: - suspected lymphoma   - s/p bronchoscopy w/ EBUS and lymph node biopsy 10/18   - f/up path   - Hematology and Pulmonary f/up as outpatient     ·  Problem: Tumor lysis syndrome.   ·  Plan: Mild tumor lysis syndrome, resolved    -s/p IV fluids   -started Allopurinol 300mg daily   -monitored TLS labs     ·  Problem: Leukocytosis.   ·  Plan: -Has had elevated WBC since presentation, likely reactive in setting of malignancy    -no infectious symptoms currently  -CTA chest w/ clear lungs   -blood cultures NGTD  -stable off antibiotics.   28 year old M w/ recent findings of mediastinal mass with inconclusive biopsy who presents for expedited workup     ·  Problem: Mediastinal lymphadenopathy.   ·  Plan: - suspected lymphoma   - s/p bronchoscopy w/ EBUS and lymph node biopsy 10/18   - f/up path   - Hematology and Pulmonary f/up as outpatient     ·  Problem: Tumor lysis syndrome.   ·  Plan: Mild tumor lysis syndrome, resolved    -s/p IV fluids   -started Allopurinol 300mg daily   -monitored TLS labs     ·  Problem: Leukocytosis.   ·  Plan: -Has had elevated WBC since presentation, likely reactive in setting of malignancy    -no infectious symptoms currently  -CTA chest w/ clear lungs   -blood cultures NGTD  -stable off antibiotics.      Patient was seen and evaluated. He reported feeling better and denies any acute complaints at this time. Patient states he wants to go home and does not want to wait for results. Discussed with him extensively the need to stay in order to determine if pathologist had enough tissue sample. Discussed with hematology and recommended pt staying but since pt is insisting on going home, Hematology/oncology recommended pt calling [facility] on Monday to follow up pathology reports and to make an appointment.     Discussed discharge medications, plan and outpatient follow up with patient. All questions answered and patient continues to insist that he wants to go home and sign out AMA.   Patient is hemodynamically stable  at this time and will need to follow up with PCP, Pulmonology and Hematology/Oncology. Medications sent to pharmacy.

## 2023-10-19 NOTE — DISCHARGE NOTE PROVIDER - ATTENDING DISCHARGE PHYSICAL EXAMINATION:
CONSTITUTIONAL: NAD  NECK: Trachea midline without palpable neck mass; thyroid not enlarged and non-tender  RESPIRATORY: Normal respiratory effort; lungs are clear to auscultation bilaterally  CARDIOVASCULAR: Regular rate and rhythm, normal S1 and S2, no murmur/rub/gallop; No lower extremity edema; Peripheral pulses are 2+ bilaterally  ABDOMEN: Nontender to palpation, normoactive bowel sounds, no rebound/guarding; No hepatosplenomegaly  MUSCLOSKELETAL: no clubbing or cyanosis of digits; no joint swelling or tenderness to palpation  PSYCH: A+O to person, place, and time; affect appropriate

## 2023-10-19 NOTE — DISCHARGE NOTE PROVIDER - CARE PROVIDER_API CALL
Jony Mosqueda  Pulmonary Disease  [address] [place] [address]  Phone: [phone]  Fax: [phone]  Scheduled Appointment: 10/30/2023 02:00 PM   Joyn Mosqueda  Pulmonary Disease  [address] [place] [address]  Phone: [phone]  Fax: [phone]  Scheduled Appointment: 10/30/2023 02:00 PM    Genaro Herron  Internal Medicine  [address] [place] [id number]  Phone: [phone]  Fax: [phone]  Established Patient  Follow Up Time: 1-3 days

## 2023-10-19 NOTE — DISCHARGE NOTE PROVIDER - NSDCFUADDAPPT_GEN_ALL_CORE_FT
follow up with Dr. Mosqueda to discuss results of bx - you have an appointment for October 30th at 2pm. follow up with Dr. Mosqueda to discuss results of bx - you have an appointment for October 30th at 2pm.    Pathology results still pending - please call [facility] on Monday to follow up on results. From there, hematology/oncology will be able to provide appropriate outpatient follow up care for you. follow up with Dr. Mosqueda to discuss results of bx - you have an appointment for October 30th at 2pm.    Pathology results still pending - please call [facility] (previously known [facility] [place]  with number [phone] on Monday to follow up on results. From there, hematology/oncology will be able to provide appropriate outpatient follow up care for you.

## 2023-10-19 NOTE — DISCHARGE NOTE PROVIDER - NSDCFUSCHEDAPPT_GEN_ALL_CORE_FT
Deja Corea  [facility]  [address]  Scheduled Appointment: 10/20/2023    Jony Mosqueda  [facility]  [address]  Scheduled Appointment: 10/30/2023     Jony Mosqueda  [facility]  PULED [address] [place] [address]  Scheduled Appointment: 10/30/2023

## 2023-10-20 ENCOUNTER — APPOINTMENT (OUTPATIENT)
Dept: PULMONOLOGY | Facility: CLINIC | Age: 28
End: 2023-10-20

## 2023-10-20 ENCOUNTER — TRANSCRIPTION ENCOUNTER (OUTPATIENT)
Age: 28
End: 2023-10-20

## 2023-10-20 VITALS
HEART RATE: 99 BPM | TEMPERATURE: 99 F | OXYGEN SATURATION: 98 % | SYSTOLIC BLOOD PRESSURE: 118 MMHG | DIASTOLIC BLOOD PRESSURE: 80 MMHG | RESPIRATION RATE: 18 BRPM

## 2023-10-20 LAB
ALBUMIN SERPL ELPH-MCNC: 4 G/DL — SIGNIFICANT CHANGE UP (ref 3.3–5)
ALBUMIN SERPL ELPH-MCNC: 4 G/DL — SIGNIFICANT CHANGE UP (ref 3.3–5)
ALP SERPL-CCNC: 148 U/L — HIGH (ref 40–120)
ALP SERPL-CCNC: 148 U/L — HIGH (ref 40–120)
ALT FLD-CCNC: 14 U/L — SIGNIFICANT CHANGE UP (ref 4–41)
ALT FLD-CCNC: 14 U/L — SIGNIFICANT CHANGE UP (ref 4–41)
ANION GAP SERPL CALC-SCNC: 13 MMOL/L — SIGNIFICANT CHANGE UP (ref 7–14)
ANION GAP SERPL CALC-SCNC: 13 MMOL/L — SIGNIFICANT CHANGE UP (ref 7–14)
APTT BLD: 30.4 SEC — SIGNIFICANT CHANGE UP (ref 24.5–35.6)
APTT BLD: 30.4 SEC — SIGNIFICANT CHANGE UP (ref 24.5–35.6)
AST SERPL-CCNC: 13 U/L — SIGNIFICANT CHANGE UP (ref 4–40)
AST SERPL-CCNC: 13 U/L — SIGNIFICANT CHANGE UP (ref 4–40)
BASOPHILS # BLD AUTO: 0.22 K/UL — HIGH (ref 0–0.2)
BASOPHILS # BLD AUTO: 0.22 K/UL — HIGH (ref 0–0.2)
BASOPHILS NFR BLD AUTO: 0.9 % — SIGNIFICANT CHANGE UP (ref 0–2)
BASOPHILS NFR BLD AUTO: 0.9 % — SIGNIFICANT CHANGE UP (ref 0–2)
BILIRUB SERPL-MCNC: 0.4 MG/DL — SIGNIFICANT CHANGE UP (ref 0.2–1.2)
BILIRUB SERPL-MCNC: 0.4 MG/DL — SIGNIFICANT CHANGE UP (ref 0.2–1.2)
BUN SERPL-MCNC: 8 MG/DL — SIGNIFICANT CHANGE UP (ref 7–23)
BUN SERPL-MCNC: 8 MG/DL — SIGNIFICANT CHANGE UP (ref 7–23)
CALCIUM SERPL-MCNC: 9.3 MG/DL — SIGNIFICANT CHANGE UP (ref 8.4–10.5)
CALCIUM SERPL-MCNC: 9.3 MG/DL — SIGNIFICANT CHANGE UP (ref 8.4–10.5)
CHLORIDE SERPL-SCNC: 98 MMOL/L — SIGNIFICANT CHANGE UP (ref 98–107)
CHLORIDE SERPL-SCNC: 98 MMOL/L — SIGNIFICANT CHANGE UP (ref 98–107)
CO2 SERPL-SCNC: 23 MMOL/L — SIGNIFICANT CHANGE UP (ref 22–31)
CO2 SERPL-SCNC: 23 MMOL/L — SIGNIFICANT CHANGE UP (ref 22–31)
CREAT SERPL-MCNC: 0.93 MG/DL — SIGNIFICANT CHANGE UP (ref 0.5–1.3)
CREAT SERPL-MCNC: 0.93 MG/DL — SIGNIFICANT CHANGE UP (ref 0.5–1.3)
CULTURE RESULTS: SIGNIFICANT CHANGE UP
CULTURE RESULTS: SIGNIFICANT CHANGE UP
D DIMER BLD IA.RAPID-MCNC: 287 NG/ML DDU — HIGH
D DIMER BLD IA.RAPID-MCNC: 287 NG/ML DDU — HIGH
EGFR: 116 ML/MIN/1.73M2 — SIGNIFICANT CHANGE UP
EGFR: 116 ML/MIN/1.73M2 — SIGNIFICANT CHANGE UP
EOSINOPHIL # BLD AUTO: 0.22 K/UL — SIGNIFICANT CHANGE UP (ref 0–0.5)
EOSINOPHIL # BLD AUTO: 0.22 K/UL — SIGNIFICANT CHANGE UP (ref 0–0.5)
EOSINOPHIL NFR BLD AUTO: 0.9 % — SIGNIFICANT CHANGE UP (ref 0–6)
EOSINOPHIL NFR BLD AUTO: 0.9 % — SIGNIFICANT CHANGE UP (ref 0–6)
GIANT PLATELETS BLD QL SMEAR: PRESENT — SIGNIFICANT CHANGE UP
GIANT PLATELETS BLD QL SMEAR: PRESENT — SIGNIFICANT CHANGE UP
GLUCOSE SERPL-MCNC: 80 MG/DL — SIGNIFICANT CHANGE UP (ref 70–99)
GLUCOSE SERPL-MCNC: 80 MG/DL — SIGNIFICANT CHANGE UP (ref 70–99)
HCT VFR BLD CALC: 38.7 % — LOW (ref 39–50)
HCT VFR BLD CALC: 38.7 % — LOW (ref 39–50)
HGB BLD-MCNC: 12.4 G/DL — LOW (ref 13–17)
HGB BLD-MCNC: 12.4 G/DL — LOW (ref 13–17)
IANC: 20.85 K/UL — HIGH (ref 1.8–7.4)
IANC: 20.85 K/UL — HIGH (ref 1.8–7.4)
INR BLD: 1.11 RATIO — SIGNIFICANT CHANGE UP (ref 0.85–1.18)
INR BLD: 1.11 RATIO — SIGNIFICANT CHANGE UP (ref 0.85–1.18)
LDH SERPL L TO P-CCNC: 146 U/L — SIGNIFICANT CHANGE UP (ref 135–225)
LDH SERPL L TO P-CCNC: 146 U/L — SIGNIFICANT CHANGE UP (ref 135–225)
LYMPHOCYTES # BLD AUTO: 1.97 K/UL — SIGNIFICANT CHANGE UP (ref 1–3.3)
LYMPHOCYTES # BLD AUTO: 1.97 K/UL — SIGNIFICANT CHANGE UP (ref 1–3.3)
LYMPHOCYTES # BLD AUTO: 7.9 % — LOW (ref 13–44)
LYMPHOCYTES # BLD AUTO: 7.9 % — LOW (ref 13–44)
MAGNESIUM SERPL-MCNC: 2.2 MG/DL — SIGNIFICANT CHANGE UP (ref 1.6–2.6)
MAGNESIUM SERPL-MCNC: 2.2 MG/DL — SIGNIFICANT CHANGE UP (ref 1.6–2.6)
MANUAL SMEAR VERIFICATION: SIGNIFICANT CHANGE UP
MANUAL SMEAR VERIFICATION: SIGNIFICANT CHANGE UP
MCHC RBC-ENTMCNC: 26.3 PG — LOW (ref 27–34)
MCHC RBC-ENTMCNC: 26.3 PG — LOW (ref 27–34)
MCHC RBC-ENTMCNC: 32 GM/DL — SIGNIFICANT CHANGE UP (ref 32–36)
MCHC RBC-ENTMCNC: 32 GM/DL — SIGNIFICANT CHANGE UP (ref 32–36)
MCV RBC AUTO: 82.2 FL — SIGNIFICANT CHANGE UP (ref 80–100)
MCV RBC AUTO: 82.2 FL — SIGNIFICANT CHANGE UP (ref 80–100)
MICROCYTES BLD QL: SLIGHT — SIGNIFICANT CHANGE UP
MICROCYTES BLD QL: SLIGHT — SIGNIFICANT CHANGE UP
MONOCYTES # BLD AUTO: 0.42 K/UL — SIGNIFICANT CHANGE UP (ref 0–0.9)
MONOCYTES # BLD AUTO: 0.42 K/UL — SIGNIFICANT CHANGE UP (ref 0–0.9)
MONOCYTES NFR BLD AUTO: 1.7 % — LOW (ref 2–14)
MONOCYTES NFR BLD AUTO: 1.7 % — LOW (ref 2–14)
NEUTROPHILS # BLD AUTO: 21.91 K/UL — HIGH (ref 1.8–7.4)
NEUTROPHILS # BLD AUTO: 21.91 K/UL — HIGH (ref 1.8–7.4)
NEUTROPHILS NFR BLD AUTO: 87.7 % — HIGH (ref 43–77)
NEUTROPHILS NFR BLD AUTO: 87.7 % — HIGH (ref 43–77)
PHOSPHATE SERPL-MCNC: 4.2 MG/DL — SIGNIFICANT CHANGE UP (ref 2.5–4.5)
PHOSPHATE SERPL-MCNC: 4.2 MG/DL — SIGNIFICANT CHANGE UP (ref 2.5–4.5)
PLAT MORPH BLD: NORMAL — SIGNIFICANT CHANGE UP
PLAT MORPH BLD: NORMAL — SIGNIFICANT CHANGE UP
PLATELET # BLD AUTO: 457 K/UL — HIGH (ref 150–400)
PLATELET # BLD AUTO: 457 K/UL — HIGH (ref 150–400)
PLATELET COUNT - ESTIMATE: NORMAL — SIGNIFICANT CHANGE UP
PLATELET COUNT - ESTIMATE: NORMAL — SIGNIFICANT CHANGE UP
POTASSIUM SERPL-MCNC: 3.5 MMOL/L — SIGNIFICANT CHANGE UP (ref 3.5–5.3)
POTASSIUM SERPL-MCNC: 3.5 MMOL/L — SIGNIFICANT CHANGE UP (ref 3.5–5.3)
POTASSIUM SERPL-SCNC: 3.5 MMOL/L — SIGNIFICANT CHANGE UP (ref 3.5–5.3)
POTASSIUM SERPL-SCNC: 3.5 MMOL/L — SIGNIFICANT CHANGE UP (ref 3.5–5.3)
PROT SERPL-MCNC: 7.9 G/DL — SIGNIFICANT CHANGE UP (ref 6–8.3)
PROT SERPL-MCNC: 7.9 G/DL — SIGNIFICANT CHANGE UP (ref 6–8.3)
PROTHROM AB SERPL-ACNC: 12.4 SEC — SIGNIFICANT CHANGE UP (ref 9.5–13)
PROTHROM AB SERPL-ACNC: 12.4 SEC — SIGNIFICANT CHANGE UP (ref 9.5–13)
RBC # BLD: 4.71 M/UL — SIGNIFICANT CHANGE UP (ref 4.2–5.8)
RBC # BLD: 4.71 M/UL — SIGNIFICANT CHANGE UP (ref 4.2–5.8)
RBC # FLD: 14.6 % — HIGH (ref 10.3–14.5)
RBC # FLD: 14.6 % — HIGH (ref 10.3–14.5)
RBC BLD AUTO: NORMAL — SIGNIFICANT CHANGE UP
RBC BLD AUTO: NORMAL — SIGNIFICANT CHANGE UP
SODIUM SERPL-SCNC: 134 MMOL/L — LOW (ref 135–145)
SODIUM SERPL-SCNC: 134 MMOL/L — LOW (ref 135–145)
SPECIMEN SOURCE: SIGNIFICANT CHANGE UP
SPECIMEN SOURCE: SIGNIFICANT CHANGE UP
URATE SERPL-MCNC: 5.4 MG/DL — SIGNIFICANT CHANGE UP (ref 3.4–8.8)
URATE SERPL-MCNC: 5.4 MG/DL — SIGNIFICANT CHANGE UP (ref 3.4–8.8)
VARIANT LYMPHS # BLD: 0.9 % — SIGNIFICANT CHANGE UP (ref 0–6)
VARIANT LYMPHS # BLD: 0.9 % — SIGNIFICANT CHANGE UP (ref 0–6)
WBC # BLD: 24.98 K/UL — HIGH (ref 3.8–10.5)
WBC # BLD: 24.98 K/UL — HIGH (ref 3.8–10.5)
WBC # FLD AUTO: 24.98 K/UL — HIGH (ref 3.8–10.5)
WBC # FLD AUTO: 24.98 K/UL — HIGH (ref 3.8–10.5)

## 2023-10-20 PROCEDURE — 99239 HOSP IP/OBS DSCHRG MGMT >30: CPT

## 2023-10-20 RX ORDER — ALLOPURINOL 300 MG
1 TABLET ORAL
Qty: 30 | Refills: 0
Start: 2023-10-20 | End: 2023-11-18

## 2023-10-20 RX ADMIN — Medication 300 MILLIGRAM(S): at 12:19

## 2023-10-20 RX ADMIN — BENZOCAINE AND MENTHOL 1 LOZENGE: 5; 1 LIQUID ORAL at 17:45

## 2023-10-20 RX ADMIN — BENZOCAINE AND MENTHOL 1 LOZENGE: 5; 1 LIQUID ORAL at 12:19

## 2023-10-20 NOTE — PROGRESS NOTE ADULT - PROBLEM SELECTOR PLAN 4
DVT prophylaxis: Lovenox 40 mg daily     Full code
DVT prophylaxis: Lovenox 40 mg daily     Full code    DC home likely in AM pending prelim path results
DVT prophylaxis: Lovenox 40 mg daily     Full code
DVT prophylaxis: Lovenox 40 mg daily     Full code    DC home likely in AM pending prelim path results

## 2023-10-20 NOTE — CHART NOTE - NSCHARTNOTEFT_GEN_A_CORE
Communicated with Pathology regarding the biopsy sample.  Pending FNA without core biopsy; cytology will review and get back if they have adequate tissue; flow has been signed out and it is negative.  Ideally, we would want the patient to stay until biopsy is confirmed to be adequate as if it's not adequate, that will require repeat Bx.   However, patient is adamant about leaving prior to the confirmation; in that case, we have emailed [facility] team to set up follow up appointment as soon as Bx is back.  Patient  will need to follow up within a week if he happens to leave.

## 2023-10-20 NOTE — CHART NOTE - NSCHARTNOTEFT_GEN_A_CORE
Called by nurse to evaluate patient who wishes to leave the hospital against medical advice. Patient is A&O x3 and has full capacity to make his own medical decisions. Pt was informed of their medical conditions, benefits, and alternatives to treatment as well as the risks of refusing treatment and the seriousness of leaving against medical advice such as the risks of heart attack, stroke, seizure, and even death were fully explained to the patient.  After expressing full understanding, patient then signs out against medical advice witnessed by the nurse.  Attending made aware.   Heme/Onc emailed [facility] to make an appointment with patient. Pt instructed to call [facility] on Monday for biopsy results and pt has follow up appointment in place with Dr. Mosqueda on October 30th. Pt will follow up with PCP on Monday to get repeat labs drawn. Discussed plan with mother at bedside as well.

## 2023-10-20 NOTE — PROGRESS NOTE ADULT - PROBLEM SELECTOR PROBLEM 1
Mediastinal lymphadenopathy

## 2023-10-20 NOTE — PROGRESS NOTE ADULT - PROBLEM SELECTOR PLAN 3
-Has had elevated WBC since presentation, likely reactive in setting of malignancy    -no infectious symptoms currently  -CTA chest w/ clear lungs   -blood cultures NGTD  -continue to monitor off antibiotics
-Has had elevated WBC since presentation, likely reactive in setting of malignancy    -no infectious symptoms currently  -CTA chest w/ clear lungs   -f/up blood cultures   -continue to monitor off antibiotics
50

## 2023-10-20 NOTE — PROGRESS NOTE ADULT - PROBLEM SELECTOR PLAN 1
- suspected lymphoma   - s/p bronchoscopy w/ EBUS and lymph node biopsy 10/18   - f/up path   - Hematology and Pulmonary f/up
- suspected lymphoma   - daily DIC labs (PT, INR, PTT, D-dimer)   - Core biopsy w/ IR versus Pulm via EBUS depending on location of lymph node (either mediastinal conglomerate, or more likely right paratracheal lymph node measuring 3.4 cm).   - heme to send peripheral flow cytometry and to review peripheral smear  - will workup for other types of mediastinal masses: ordered sputum AFB; HCG/AFP/TSH wnl   - Hematology input appreciated
- suspected lymphoma   - s/p bronchoscopy w/ EBUS and lymph node biopsy 10/18   - f/up path   - Hematology and Pulmonary f/up
- suspected lymphoma   - daily DIC labs (PT, INR, PTT, D-dimer)   - Plan for bronchoscopy w/ EBUS and lymph node biopsy today  - will workup for other types of mediastinal masses: ordered sputum AFB; HCG/AFP/TSH wnl   - Hematology and Pulmonary input appreciated

## 2023-10-20 NOTE — PHARMACOTHERAPY INTERVENTION NOTE - COMMENTS
Discharge medication was reviewed with the patient and his mother at bedside. Current medication schedule was discussed in detail including: medication name, indication, dose, administration times, side effects and special instructions. All questions and concerns were answered and addressed. Patient verbalized understanding and was provided with educational handout.    Veronica Chávez, PharmD  Clinical Pharmacy Specialist  [facility] [id number]

## 2023-10-20 NOTE — DISCHARGE NOTE NURSING/CASE MANAGEMENT/SOCIAL WORK - NSDCFUADDAPPT_GEN_ALL_CORE_FT
follow up with Dr. Mosqueda to discuss results of bx - you have an appointment for October 30th at 2pm.    Pathology results still pending - please call [facility] (previously known as [facility] [place]),  with number [phone] on Monday to follow up on results. From there, hematology/oncology will be able to provide appropriate outpatient follow up care for you.

## 2023-10-20 NOTE — PROGRESS NOTE ADULT - ASSESSMENT
28 year old M w/ recent findings of mediastinal mass with inconclusive biopsy who presents for expedited workup 
Mr. Gaitan is a 28 year old M w/ recent findings of mediastinal mass with biopsy who presents for workup given recent inconclusive biopsy results. He was initially sent by his PCP to ED for chest pain 3 weeks ago, when patient was admitted at [facility] for workup at which point imaging was performed that demonstrated bulky mediastinal lymphadenopathy that was reportedly biopsied followed by a requested for additional tissue for further characterization.     #Mediastinal Lymphadenopathy  - patient w/reported previous imaging at OSH demonstrating bulky mediastinal LAD, CTA on 10/16/23 at [facility] demonstrating large anterior mediastinal node as well as large R paratracheal node   - s/p bronchoscopy, EBUS, with lymph node biopsy  - should follow up with Dr. Mosqueda in 1-2 weeks to discuss results of bx, can email [email or web address] to arrange  
28 year old M w/ recent findings of mediastinal mass with inconclusive biopsy who presents for expedited workup 

## 2023-10-20 NOTE — DISCHARGE NOTE NURSING/CASE MANAGEMENT/SOCIAL WORK - PATIENT PORTAL LINK FT
You can access the FollowMyHealth Patient Portal offered by [facility] by registering at the following website: [email or web address]. By joining [employer]’s FollowMyHealth portal, you will also be able to view your health information using other applications (apps) compatible with our system.

## 2023-10-20 NOTE — DISCHARGE NOTE NURSING/CASE MANAGEMENT/SOCIAL WORK - NSDCPEFALRISK_GEN_ALL_CORE
For information on Fall & Injury Prevention, visit: [email or web address] OR  [email or web address] OR  https://www.cdc.gov/steadi/patient.html

## 2023-10-20 NOTE — PROGRESS NOTE ADULT - SUBJECTIVE AND OBJECTIVE BOX
PROGRESS NOTE:     Patient is a 28y old  Male who presents with a chief complaint of workup of lymphoma (18 Oct 2023 06:30)      SUBJECTIVE / OVERNIGHT EVENTS: No acute events.     ADDITIONAL REVIEW OF SYSTEMS:    MEDICATIONS  (STANDING):  allopurinol 300 milliGRAM(s) Oral daily  lactated ringers. 1000 milliLiter(s) (100 mL/Hr) IV Continuous <Continuous>    MEDICATIONS  (PRN):  acetaminophen     Tablet .. 650 milliGRAM(s) Oral every 6 hours PRN Mild Pain (1 - 3)  melatonin 3 milliGRAM(s) Oral at bedtime PRN Insomnia      CAPILLARY BLOOD GLUCOSE        I&O's Summary      PHYSICAL EXAM:  Vital Signs Last 24 Hrs  T(C): --  T(F): --  HR: 80 (18 Oct 2023 06:38) (80 - 80)  BP: 112/67 (18 Oct 2023 06:38) (112/67 - 112/67)  BP(mean): --  RR: 16 (18 Oct 2023 06:38) (16 - 16)  SpO2: 100% (18 Oct 2023 06:38) (100% - 100%)      CONSTITUTIONAL: NAD  NECK: Trachea midline without palpable neck mass; thyroid not enlarged and non-tender  RESPIRATORY: Normal respiratory effort; lungs are clear to auscultation bilaterally  CARDIOVASCULAR: Regular rate and rhythm, normal S1 and S2, no murmur/rub/gallop; No lower extremity edema; Peripheral pulses are 2+ bilaterally  ABDOMEN: Nontender to palpation, normoactive bowel sounds, no rebound/guarding; No hepatosplenomegaly  MUSCLOSKELETAL: no clubbing or cyanosis of digits; no joint swelling or tenderness to palpation  PSYCH: A+O to person, place, and time; affect appropriate      LABS:                        12.0   19.83 )-----------( 451      ( 18 Oct 2023 05:40 )             36.4     10-18    137  |  101  |  7   ----------------------------<  86  3.9   |  24  |  0.76    Ca    9.3      18 Oct 2023 05:40  Phos  4.2     10-18  Mg     2.20     10-18    TPro  7.9  /  Alb  3.9  /  TBili  0.3  /  DBili  x   /  AST  14  /  ALT  14  /  AlkPhos  151<H>  10-18    PT/INR - ( 18 Oct 2023 05:40 )   PT: 12.3 sec;   INR: 1.10 ratio         PTT - ( 18 Oct 2023 05:40 )  PTT:31.3 sec      Urinalysis Basic - ( 18 Oct 2023 05:40 )    Color: x / Appearance: x / SG: x / pH: x  Gluc: 86 mg/dL / Ketone: x  / Bili: x / Urobili: x   Blood: x / Protein: x / Nitrite: x   Leuk Esterase: x / RBC: x / WBC x   Sq Epi: x / Non Sq Epi: x / Bacteria: x        Culture - Blood (collected 17 Oct 2023 06:30)  Source: .Blood Blood-Peripheral  Preliminary Report (18 Oct 2023 10:01):    No growth at 24 hours    Culture - Blood (collected 17 Oct 2023 06:25)  Source: .Blood Blood-Peripheral  Preliminary Report (18 Oct 2023 10:01):    No growth at 24 hours        RADIOLOGY & ADDITIONAL TESTS:  Results Reviewed:   Imaging Personally Reviewed:  Electrocardiogram Personally Reviewed:    COORDINATION OF CARE:  Care Discussed with Consultants/Other Providers [Y/N]:  Prior or Outpatient Records Reviewed [Y/N]:  
CHIEF COMPLAINT:Patient is a 28y old  Male who presents with a chief complaint of Other diseases of mediastinum    Patient is a 28y Male with no pertinent past medical history who presents to [facility] with expedited workup given recent inconclusive biopsy results (18 Oct 2023 15:56)      Interval Events:  notes some flecks of blood when coughing o/n and this AM  asking if able to be discharged today    REVIEW OF SYSTEMS:  [x] All other systems negative except per HPI   [ ] Unable to assess ROS because ________    OBJECTIVE:  ICU Vital Signs Last 24 Hrs  T(C): 37.1 (19 Oct 2023 07:50), Max: 37.1 (19 Oct 2023 07:50)  T(F): 98.8 (19 Oct 2023 07:50), Max: 98.8 (19 Oct 2023 07:50)  HR: 82 (19 Oct 2023 07:50) (73 - 109)  BP: 112/73 (19 Oct 2023 07:50) (95/56 - 119/75)  BP(mean): 73 (18 Oct 2023 21:00) (66 - 81)  ABP: --  ABP(mean): --  RR: 17 (19 Oct 2023 07:50) (12 - 20)  SpO2: 98% (19 Oct 2023 07:50) (94% - 100%)    O2 Parameters below as of 19 Oct 2023 07:50  Patient On (Oxygen Delivery Method): room air              10-18 @ 07:01  -  10-19 @ 07:00  --------------------------------------------------------  IN: 1360 mL / OUT: 1075 mL / NET: 285 mL        PHYSICAL EXAM:  Gen: NAD, WD, WN  HEENT: MMM, PERRL, EOMI  Neck: No JVP elevation  CV: regular  Lung: CTAB  Abd: Soft, NT, ND  Ext: WWP, no CCE  Skin: No rash  Neuro: A&Ox3, no focal deficits    HOSPITAL MEDICATIONS:  MEDICATIONS  (STANDING):  allopurinol 300 milliGRAM(s) Oral daily  lactated ringers. 1000 milliLiter(s) (100 mL/Hr) IV Continuous <Continuous>    MEDICATIONS  (PRN):  acetaminophen     Tablet .. 650 milliGRAM(s) Oral every 6 hours PRN Mild Pain (1 - 3)  melatonin 3 milliGRAM(s) Oral at bedtime PRN Insomnia      LABS:    The Labs were reviewed by me   The Radiology was reviewed by me    EKG tracing reviewed by me    10-19    139  |  101  |  7   ----------------------------<  90  3.9   |  25  |  0.79  10-18    137  |  101  |  7   ----------------------------<  86  3.9   |  24  |  0.76  10-17    140  |  105  |  7   ----------------------------<  100<H>  3.8   |  23  |  0.87    Ca    9.6      19 Oct 2023 06:05  Ca    9.3      18 Oct 2023 05:40  Ca    9.3      17 Oct 2023 04:55  Phos  4.6     10-19  Mg     2.20     10-19    TPro  8.0  /  Alb  4.0  /  TBili  0.4  /  DBili  x   /  AST  9   /  ALT  12  /  AlkPhos  148<H>  10-19  TPro  7.9  /  Alb  3.9  /  TBili  0.3  /  DBili  x   /  AST  14  /  ALT  14  /  AlkPhos  151<H>  10-18  TPro  8.2  /  Alb  3.9  /  TBili  0.5  /  DBili  x   /  AST  7   /  ALT  12  /  AlkPhos  143<H>  10-17    Magnesium: 2.20 mg/dL (10-19-23 @ 06:05)  Magnesium: 2.20 mg/dL (10-18-23 @ 05:40)  Magnesium: 2.20 mg/dL (10-17-23 @ 04:55)  Magnesium: 2.20 mg/dL (10-16-23 @ 22:04)  Magnesium: 2.40 mg/dL (10-16-23 @ 16:34)    Phosphorus: 4.6 mg/dL (10-19-23 @ 06:05)  Phosphorus: 4.2 mg/dL (10-18-23 @ 05:40)  Phosphorus: 4.2 mg/dL (10-17-23 @ 04:55)  Phosphorus: 4.4 mg/dL (10-16-23 @ 22:04)  Phosphorus: 3.5 mg/dL (10-16-23 @ 16:34)      PT/INR - ( 19 Oct 2023 06:05 )   PT: 12.8 sec;   INR: 1.14 ratio         PTT - ( 19 Oct 2023 06:05 )  PTT:32.1 sec              Urinalysis Basic - ( 19 Oct 2023 06:05 )    Color: x / Appearance: x / SG: x / pH: x  Gluc: 90 mg/dL / Ketone: x  / Bili: x / Urobili: x   Blood: x / Protein: x / Nitrite: x   Leuk Esterase: x / RBC: x / WBC x   Sq Epi: x / Non Sq Epi: x / Bacteria: x                              12.2   19.34 )-----------( 452      ( 19 Oct 2023 06:05 )             36.9                         12.0   19.83 )-----------( 451      ( 18 Oct 2023 05:40 )             36.4                         11.7   18.20 )-----------( 417      ( 17 Oct 2023 04:55 )             36.5     CAPILLARY BLOOD GLUCOSE            MICROBIOLOGY:     RADIOLOGY:  [ ] Reviewed and interpreted by me    Point of Care Ultrasound Findings:    PFT:    EKG:
PROGRESS NOTE:     Patient is a 28y old  Male who presents with a chief complaint of workup of lymphoma (19 Oct 2023 12:29)      SUBJECTIVE / OVERNIGHT EVENTS: No acute events.     ADDITIONAL REVIEW OF SYSTEMS:    MEDICATIONS  (STANDING):  allopurinol 300 milliGRAM(s) Oral daily  lactated ringers. 1000 milliLiter(s) (100 mL/Hr) IV Continuous <Continuous>    MEDICATIONS  (PRN):  acetaminophen     Tablet .. 650 milliGRAM(s) Oral every 6 hours PRN Mild Pain (1 - 3)  benzocaine/menthol Lozenge 1 Lozenge Oral every 3 hours PRN Sore Throat  melatonin 3 milliGRAM(s) Oral at bedtime PRN Insomnia      CAPILLARY BLOOD GLUCOSE        I&O's Summary    18 Oct 2023 07:01  -  19 Oct 2023 07:00  --------------------------------------------------------  IN: 1360 mL / OUT: 1075 mL / NET: 285 mL        PHYSICAL EXAM:  Vital Signs Last 24 Hrs  T(C): 36.9 (19 Oct 2023 23:43), Max: 37.2 (19 Oct 2023 11:50)  T(F): 98.4 (19 Oct 2023 23:43), Max: 98.9 (19 Oct 2023 11:50)  HR: 89 (19 Oct 2023 23:43) (73 - 97)  BP: 116/72 (19 Oct 2023 23:43) (105/62 - 119/75)  BP(mean): --  RR: 18 (19 Oct 2023 23:43) (16 - 18)  SpO2: 98% (19 Oct 2023 23:43) (97% - 99%)    Parameters below as of 19 Oct 2023 23:43  Patient On (Oxygen Delivery Method): room air        CONSTITUTIONAL: NAD, well-developed  RESPIRATORY: Normal respiratory effort; lungs are clear to auscultation bilaterally  CARDIOVASCULAR: Regular rate and rhythm, normal S1 and S2, no murmur/rub/gallop; No lower extremity edema; Peripheral pulses are 2+ bilaterally  ABDOMEN: Nontender to palpation, normoactive bowel sounds, no rebound/guarding; No hepatosplenomegaly  MUSCLOSKELETAL: no clubbing or cyanosis of digits; no joint swelling or tenderness to palpation  PSYCH: A+O to person, place, and time; affect appropriate    LABS:                        12.2   19.34 )-----------( 452      ( 19 Oct 2023 06:05 )             36.9     10-19    139  |  101  |  7   ----------------------------<  90  3.9   |  25  |  0.79    Ca    9.6      19 Oct 2023 06:05  Phos  4.6     10-19  Mg     2.20     10-19    TPro  8.0  /  Alb  4.0  /  TBili  0.4  /  DBili  x   /  AST  9   /  ALT  12  /  AlkPhos  148<H>  10-19    PT/INR - ( 19 Oct 2023 06:05 )   PT: 12.8 sec;   INR: 1.14 ratio         PTT - ( 19 Oct 2023 06:05 )  PTT:32.1 sec      Urinalysis Basic - ( 19 Oct 2023 06:05 )    Color: x / Appearance: x / SG: x / pH: x  Gluc: 90 mg/dL / Ketone: x  / Bili: x / Urobili: x   Blood: x / Protein: x / Nitrite: x   Leuk Esterase: x / RBC: x / WBC x   Sq Epi: x / Non Sq Epi: x / Bacteria: x        Culture - Acid Fast - Bronchial w/Smear (collected 18 Oct 2023 18:00)  Source: .Bronchial RIGHT UPPER LOBE BAL    Culture - Bronchial (collected 18 Oct 2023 18:00)  Source: .Bronchial RIGHT UPPER LOBE BAL  Gram Stain (19 Oct 2023 03:45):    No polymorphonuclear leukocytes seen per low power field    Rare Squamous epithelial cells seen per low power field    Few Gram positive cocci in pairs seen per oil power field  Preliminary Report (19 Oct 2023 21:04):    Normal Respiratory Josseline present    Culture - Blood (collected 17 Oct 2023 06:30)  Source: .Blood Blood-Peripheral  Preliminary Report (19 Oct 2023 10:01):    No growth at 48 Hours    Culture - Blood (collected 17 Oct 2023 06:25)  Source: .Blood Blood-Peripheral  Preliminary Report (19 Oct 2023 10:01):    No growth at 48 Hours        RADIOLOGY & ADDITIONAL TESTS:  Results Reviewed:   Imaging Personally Reviewed:  Electrocardiogram Personally Reviewed:    COORDINATION OF CARE:  Care Discussed with Consultants/Other Providers [Y/N]:  Prior or Outpatient Records Reviewed [Y/N]:  
PROGRESS NOTE:     Patient is a 28y old  Male who presents with a chief complaint of workup of lymphoma (17 Oct 2023 11:12)      SUBJECTIVE / OVERNIGHT EVENTS: No acute events.     ADDITIONAL REVIEW OF SYSTEMS:    MEDICATIONS  (STANDING):  allopurinol 300 milliGRAM(s) Oral daily  enoxaparin Injectable 40 milliGRAM(s) SubCutaneous every 24 hours  lactated ringers. 1000 milliLiter(s) (100 mL/Hr) IV Continuous <Continuous>    MEDICATIONS  (PRN):  acetaminophen     Tablet .. 650 milliGRAM(s) Oral every 6 hours PRN Mild Pain (1 - 3)  melatonin 3 milliGRAM(s) Oral at bedtime PRN Insomnia      CAPILLARY BLOOD GLUCOSE        I&O's Summary      PHYSICAL EXAM:  Vital Signs Last 24 Hrs  T(C): 36.7 (17 Oct 2023 05:33), Max: 37.2 (16 Oct 2023 18:14)  T(F): 98.1 (17 Oct 2023 05:33), Max: 99 (16 Oct 2023 18:14)  HR: 85 (17 Oct 2023 05:33) (85 - 101)  BP: 121/74 (17 Oct 2023 05:33) (118/87 - 122/73)  BP(mean): --  RR: 16 (17 Oct 2023 05:33) (16 - 18)  SpO2: 100% (17 Oct 2023 05:33) (100% - 100%)    Parameters below as of 17 Oct 2023 05:33  Patient On (Oxygen Delivery Method): room air        CONSTITUTIONAL: NAD  NECK: Trachea midline without palpable neck mass; thyroid not enlarged and non-tender  RESPIRATORY: Normal respiratory effort; lungs are clear to auscultation bilaterally  CARDIOVASCULAR: Regular rate and rhythm, normal S1 and S2, no murmur/rub/gallop; No lower extremity edema; Peripheral pulses are 2+ bilaterally  ABDOMEN: Nontender to palpation, normoactive bowel sounds, no rebound/guarding; No hepatosplenomegaly  MUSCLOSKELETAL: no clubbing or cyanosis of digits; no joint swelling or tenderness to palpation  PSYCH: A+O to person, place, and time; affect appropriate    LABS:                        11.7   18.20 )-----------( 417      ( 17 Oct 2023 04:55 )             36.5     10-17    140  |  105  |  7   ----------------------------<  100<H>  3.8   |  23  |  0.87    Ca    9.3      17 Oct 2023 04:55  Phos  4.2     10-17  Mg     2.20     10-17    TPro  8.2  /  Alb  3.9  /  TBili  0.5  /  DBili  x   /  AST  7   /  ALT  12  /  AlkPhos  143<H>  10-17    PT/INR - ( 17 Oct 2023 04:55 )   PT: 12.9 sec;   INR: 1.15 ratio         PTT - ( 17 Oct 2023 04:55 )  PTT:32.1 sec      Urinalysis Basic - ( 17 Oct 2023 04:55 )    Color: x / Appearance: x / SG: x / pH: x  Gluc: 100 mg/dL / Ketone: x  / Bili: x / Urobili: x   Blood: x / Protein: x / Nitrite: x   Leuk Esterase: x / RBC: x / WBC x   Sq Epi: x / Non Sq Epi: x / Bacteria: x          RADIOLOGY & ADDITIONAL TESTS:  Results Reviewed:   Imaging Personally Reviewed:  Electrocardiogram Personally Reviewed:    COORDINATION OF CARE:  Care Discussed with Consultants/Other Providers [Y/N]:  Prior or Outpatient Records Reviewed [Y/N]:  
Patient is a 28y old  Male who presents with a chief complaint of workup of lymphoma (19 Oct 2023 23:50)      SUBJECTIVE / OVERNIGHT EVENTS:    No events overnight. This AM, patient without n/v/d/cp/sob.      MEDICATIONS  (STANDING):  allopurinol 300 milliGRAM(s) Oral daily  lactated ringers. 1000 milliLiter(s) (100 mL/Hr) IV Continuous <Continuous>    MEDICATIONS  (PRN):  acetaminophen     Tablet .. 650 milliGRAM(s) Oral every 6 hours PRN Mild Pain (1 - 3)  benzocaine/menthol Lozenge 1 Lozenge Oral every 3 hours PRN Sore Throat  melatonin 3 milliGRAM(s) Oral at bedtime PRN Insomnia      PHYSICAL EXAM:  T(C): 36.7 (10-20-23 @ 12:00), Max: 37.1 (10-20-23 @ 08:00)  HR: 96 (10-20-23 @ 12:00) (84 - 106)  BP: 104/63 (10-20-23 @ 12:00) (100/63 - 119/75)  RR: 17 (10-20-23 @ 12:00) (16 - 18)  SpO2: 97% (10-20-23 @ 12:00) (96% - 99%)  I&O's Summary    GENERAL: NAD, well-developed  HEAD:  Atraumatic, Normocephalic, MMM  CHEST/LUNG: No use of accessory muscles, CTAB, breathing non-labored  COR: RR, no mrcg  ABD: Soft, ND/NT, +BS  PSYCH: AAOx3  NEUROLOGY: CN II-XII grossly intact, moving all extremities  SKIN: No rashes or lesions  EXT: wwp, no cce    LABS:  CAPILLARY BLOOD GLUCOSE                              12.4   24.98 )-----------( 457      ( 20 Oct 2023 06:05 )             38.7     10-20    134<L>  |  98  |  8   ----------------------------<  80  3.5   |  23  |  0.93    Ca    9.3      20 Oct 2023 06:05  Phos  4.2     10-20  Mg     2.20     10-20    TPro  7.9  /  Alb  4.0  /  TBili  0.4  /  DBili  x   /  AST  13  /  ALT  14  /  AlkPhos  148<H>  10-20    PT/INR - ( 20 Oct 2023 06:05 )   PT: 12.4 sec;   INR: 1.11 ratio         PTT - ( 20 Oct 2023 06:05 )  PTT:30.4 sec      Urinalysis Basic - ( 20 Oct 2023 06:05 )    Color: x / Appearance: x / SG: x / pH: x  Gluc: 80 mg/dL / Ketone: x  / Bili: x / Urobili: x   Blood: x / Protein: x / Nitrite: x   Leuk Esterase: x / RBC: x / WBC x   Sq Epi: x / Non Sq Epi: x / Bacteria: x        Culture - Fungal, Bronchial (collected 18 Oct 2023 18:00)  Source: .Bronchial RIGHT UPPER LOBE BAL  Preliminary Report (20 Oct 2023 07:51):    Testing in progress    Culture - Acid Fast - Bronchial w/Smear (collected 18 Oct 2023 18:00)  Source: .Bronchial RIGHT UPPER LOBE BAL    Culture - Bronchial (collected 18 Oct 2023 18:00)  Source: .Bronchial RIGHT UPPER LOBE BAL  Gram Stain (19 Oct 2023 03:45):    No polymorphonuclear leukocytes seen per low power field    Rare Squamous epithelial cells seen per low power field    Few Gram positive cocci in pairs seen per oil power field  Preliminary Report (19 Oct 2023 21:04):    Normal Respiratory Josseline present        RADIOLOGY & ADDITIONAL TESTS:    Telemetry Personally Reviewed -     Imaging Personally Reviewed -     Imaging Reviewed -     Consultant(s) Notes Reviewed -       Care Discussed with Consultants/Other Providers -

## 2023-10-20 NOTE — PROGRESS NOTE ADULT - PROBLEM SELECTOR PLAN 2
Mild tumor lysis syndrome   -s/p IV fluids   -started Allopurinol 300mg daily   -daily TLS labs (uric acid, LDH, Phos, CMP)
Mild tumor lysis syndrome   -s/p IV fluids   -started Allopurinol 300mg daily   -daily TLS labs (uric acid, LDH, Phos, CMP)
Mild tumor lysis syndrome   -c/w IV fluids   -started Allopurinol 300mg daily   -daily TLS labs (uric acid, LDH, Phos, CMP)
Mild tumor lysis syndrome   -c/w IV fluids   -started Allopurinol 300mg daily   -daily TLS labs (uric acid, LDH, Phos, CMP)

## 2023-10-21 LAB
G6PD RBC-CCNC: 15.8 U/G HGB — SIGNIFICANT CHANGE UP (ref 7–20.5)
G6PD RBC-CCNC: 15.8 U/G HGB — SIGNIFICANT CHANGE UP (ref 7–20.5)
G6PD RBC-CCNC: 15.9 U/G HGB — SIGNIFICANT CHANGE UP (ref 7–20.5)
G6PD RBC-CCNC: 15.9 U/G HGB — SIGNIFICANT CHANGE UP (ref 7–20.5)
G6PD RBC-CCNC: 17.2 U/G HGB — SIGNIFICANT CHANGE UP (ref 7–20.5)
G6PD RBC-CCNC: 17.2 U/G HGB — SIGNIFICANT CHANGE UP (ref 7–20.5)

## 2023-10-22 LAB
CULTURE RESULTS: SIGNIFICANT CHANGE UP
SPECIMEN SOURCE: SIGNIFICANT CHANGE UP

## 2023-10-30 ENCOUNTER — APPOINTMENT (OUTPATIENT)
Dept: PULMONOLOGY | Facility: CLINIC | Age: 28
End: 2023-10-30
Payer: MEDICAID

## 2023-10-30 VITALS
OXYGEN SATURATION: 99 % | HEIGHT: 68 IN | RESPIRATION RATE: 17 BRPM | BODY MASS INDEX: 27.51 KG/M2 | SYSTOLIC BLOOD PRESSURE: 139 MMHG | WEIGHT: 181.5 LBS | DIASTOLIC BLOOD PRESSURE: 93 MMHG | TEMPERATURE: 98 F | HEART RATE: 134 BPM

## 2023-10-30 PROBLEM — Z78.9 OTHER SPECIFIED HEALTH STATUS: Chronic | Status: ACTIVE | Noted: 2023-10-16

## 2023-10-30 LAB
NON-GYNECOLOGICAL CYTOLOGY STUDY: SIGNIFICANT CHANGE UP
NON-GYNECOLOGICAL CYTOLOGY STUDY: SIGNIFICANT CHANGE UP
SURGICAL PATHOLOGY STUDY: SIGNIFICANT CHANGE UP
SURGICAL PATHOLOGY STUDY: SIGNIFICANT CHANGE UP

## 2023-10-30 PROCEDURE — 99214 OFFICE O/P EST MOD 30 MIN: CPT

## 2023-11-10 ENCOUNTER — OUTPATIENT (OUTPATIENT)
Dept: OUTPATIENT SERVICES | Facility: HOSPITAL | Age: 28
LOS: 1 days | End: 2023-11-10
Payer: MEDICAID

## 2023-11-10 DIAGNOSIS — R63.4 ABNORMAL WEIGHT LOSS: ICD-10-CM

## 2023-11-10 DIAGNOSIS — C81.90 HODGKIN LYMPHOMA, UNSPECIFIED, UNSPECIFIED SITE: ICD-10-CM

## 2023-11-10 PROCEDURE — 93306 TTE W/DOPPLER COMPLETE: CPT | Mod: 26

## 2023-11-10 PROCEDURE — 93306 TTE W/DOPPLER COMPLETE: CPT

## 2023-11-13 ENCOUNTER — APPOINTMENT (OUTPATIENT)
Dept: INTERVENTIONAL RADIOLOGY/VASCULAR | Facility: HOSPITAL | Age: 28
End: 2023-11-13

## 2023-11-13 ENCOUNTER — OUTPATIENT (OUTPATIENT)
Dept: OUTPATIENT SERVICES | Facility: HOSPITAL | Age: 28
LOS: 1 days | End: 2023-11-13
Payer: MEDICAID

## 2023-11-13 ENCOUNTER — TRANSCRIPTION ENCOUNTER (OUTPATIENT)
Age: 28
End: 2023-11-13

## 2023-11-13 VITALS
RESPIRATION RATE: 16 BRPM | TEMPERATURE: 98 F | HEART RATE: 105 BPM | SYSTOLIC BLOOD PRESSURE: 118 MMHG | DIASTOLIC BLOOD PRESSURE: 62 MMHG | OXYGEN SATURATION: 96 %

## 2023-11-13 VITALS
DIASTOLIC BLOOD PRESSURE: 67 MMHG | RESPIRATION RATE: 15 BRPM | OXYGEN SATURATION: 97 % | HEART RATE: 87 BPM | SYSTOLIC BLOOD PRESSURE: 115 MMHG | TEMPERATURE: 98 F

## 2023-11-13 DIAGNOSIS — R63.4 ABNORMAL WEIGHT LOSS: ICD-10-CM

## 2023-11-13 DIAGNOSIS — C81.90 HODGKIN LYMPHOMA, UNSPECIFIED, UNSPECIFIED SITE: ICD-10-CM

## 2023-11-13 PROCEDURE — C1788: CPT

## 2023-11-13 PROCEDURE — C1769: CPT

## 2023-11-13 PROCEDURE — 76937 US GUIDE VASCULAR ACCESS: CPT

## 2023-11-13 PROCEDURE — 76937 US GUIDE VASCULAR ACCESS: CPT | Mod: 26

## 2023-11-13 PROCEDURE — 77001 FLUOROGUIDE FOR VEIN DEVICE: CPT

## 2023-11-13 PROCEDURE — 36558 INSERT TUNNELED CV CATH: CPT

## 2023-11-13 PROCEDURE — 77001 FLUOROGUIDE FOR VEIN DEVICE: CPT | Mod: 26

## 2023-11-13 RX ORDER — ONDANSETRON 8 MG/1
4 TABLET, FILM COATED ORAL ONCE
Refills: 0 | Status: DISCONTINUED | OUTPATIENT
Start: 2023-11-13 | End: 2023-11-13

## 2023-11-13 RX ORDER — FENTANYL CITRATE 50 UG/ML
25 INJECTION INTRAVENOUS
Refills: 0 | Status: DISCONTINUED | OUTPATIENT
Start: 2023-11-13 | End: 2023-11-13

## 2023-11-13 NOTE — ASU DISCHARGE PLAN (ADULT/PEDIATRIC) - CALL YOUR DOCTOR IF YOU HAVE ANY OF THE FOLLOWING:
Bleeding that does not stop/Swelling that gets worse/Wound/Surgical Site with redness, or foul smelling discharge or pus Bleeding that does not stop/Swelling that gets worse/Fever greater than (need to indicate Fahrenheit or Celsius)/Wound/Surgical Site with redness, or foul smelling discharge or pus

## 2023-11-18 LAB
CULTURE RESULTS: SIGNIFICANT CHANGE UP
CULTURE RESULTS: SIGNIFICANT CHANGE UP
SPECIMEN SOURCE: SIGNIFICANT CHANGE UP
SPECIMEN SOURCE: SIGNIFICANT CHANGE UP

## 2024-01-08 ENCOUNTER — APPOINTMENT (OUTPATIENT)
Dept: PULMONOLOGY | Facility: CLINIC | Age: 29
End: 2024-01-08
Payer: MEDICAID

## 2024-01-08 VITALS
OXYGEN SATURATION: 99 % | HEIGHT: 68 IN | HEART RATE: 105 BPM | DIASTOLIC BLOOD PRESSURE: 89 MMHG | WEIGHT: 177 LBS | BODY MASS INDEX: 26.83 KG/M2 | RESPIRATION RATE: 18 BRPM | SYSTOLIC BLOOD PRESSURE: 121 MMHG

## 2024-01-08 PROCEDURE — 99213 OFFICE O/P EST LOW 20 MIN: CPT | Mod: 25

## 2024-01-08 PROCEDURE — 76604 US EXAM CHEST: CPT

## 2024-03-25 ENCOUNTER — APPOINTMENT (OUTPATIENT)
Dept: PULMONOLOGY | Facility: CLINIC | Age: 29
End: 2024-03-25
Payer: MEDICAID

## 2024-03-25 VITALS
RESPIRATION RATE: 17 BRPM | OXYGEN SATURATION: 100 % | BODY MASS INDEX: 29.55 KG/M2 | HEART RATE: 121 BPM | HEIGHT: 68 IN | DIASTOLIC BLOOD PRESSURE: 88 MMHG | SYSTOLIC BLOOD PRESSURE: 132 MMHG | WEIGHT: 195 LBS

## 2024-03-25 PROCEDURE — 99213 OFFICE O/P EST LOW 20 MIN: CPT

## 2024-03-25 NOTE — HISTORY OF PRESENT ILLNESS
[Never] : never [TextBox_4] : Interventional Pulmonology Consultation/Visit Note  28 year old M w/ recent findings of mediastinal mass with inconclusive biopsy who presented to the hospital for expedited work up. Patient underwent flexible bronchoscopy and EBUS guided core biopsy of the mediastinal node and diagnosed with lymphoma.    Continues on treatment. Doing well. Cough has resolved. No hemoptysis. No dyspnea at rest or on exertion. Tolerating treatment well.  PET CT demonstrating improvement, but not complete resolution yet.

## 2024-03-25 NOTE — PHYSICAL EXAM
[No Acute Distress] : no acute distress [Normal Oropharynx] : normal oropharynx [Normal Appearance] : normal appearance [No Neck Mass] : no neck mass [Normal Rate/Rhythm] : normal rate/rhythm [Normal S1, S2] : normal s1, s2 [No Resp Distress] : no resp distress [No Acc Muscle Use] : no acc muscle use [Clear to Auscultation Bilaterally] : clear to auscultation bilaterally [No Abnormalities] : no abnormalities [Benign] : benign [Normal Gait] : normal gait [No Clubbing] : no clubbing [No Cyanosis] : no cyanosis [No Edema] : no edema [Normal Color/ Pigmentation] : normal color/ pigmentation [No Focal Deficits] : no focal deficits [Oriented x3] : oriented x3 [Normal Mood] : normal mood [Normal Affect] : normal affect

## 2024-03-25 NOTE — ASSESSMENT
[FreeTextEntry1] : 29 y/o M with mediastinal adenopathy and anterior mediastinal mass underwent EBUS cryobiopsy, diagnosed with classic Hodgkins lymphoma. Patient now s/p 4 cycles of treatment. Respiratory symptoms are stable. Had echo prior to starting chemo. Continue systemic therapy. No IP interventions warranted at this time. Further plans based on symptoms and treatment response. Follow up prn.

## 2024-04-22 NOTE — ED ADULT NURSE NOTE - NSFALLCONCLUSION_ED_ALL_ED
There are no exam notes on file for this visit.  Nursing note reviewed with patient.  Accurracy and completeness verified.    Chief Complaint   Patient presents with    Abdominal Pain     Start yesterday sx abdominal pain currently-10/10, hunched over, abdominal spasm, vomit- 24x/24hrs, nausea hx LMP 2/18/24 and similar episode previously tx none      HPI:    Vomiting, many times all day long and R abd pain with   Severe R lower tenderness    ROS: As per HPI.  10 point ROS of systems including Constitutional, Eyes, Respiratory, Cardiovascular, Gastroenterology, Genitourinary, Integumentary, Muscularskeletal, Psychiatric were all negative except for pertinent positives noted in my HPI.    I have reviewed and updated the patient's past medical history in the EMR. Current problems are:  There is no problem list on file for this patient.      Allergies, reviewed:   No Known Allergies    No current outpatient medications on file.       Objective:  /67   Pulse 114   Temp 98.4  F (36.9  C)   Wt 50 kg (110 lb 4.8 oz)   LMP 04/20/2024 (Approximate)   SpO2 100%   General Appearance: Patient is hunched over in distress  Chest/Respiratory Exam: Fast respirations  Cardiovascular Exam: Heart rate  Severe right lower quadrant tenderness  Skin: no rash, warm and dry.  Neurologic Exam: Nonfocal, no tremor. Normal gait.  Psychiatric Exam: Alert - appropriate, normal affect    ASSESSMENT/PLAN:    ICD-10-CM    1. Abdominal pain, right lower quadrant  R10.31         RLQ abdominal pain Differential Dx includes Gall bladder issue, ulcer, dyspepsia,  bowel problem, abdominal wall pain, ovarian cyst, appendicitis    Given risk of appendicitis, and the fact that patient is in a lot of pain and really should have fluids and pain control recommend that the patient's partner take her over to the emergency department       Rancho Carver MD MPH        
Universal Safety Interventions

## 2024-07-01 ENCOUNTER — APPOINTMENT (OUTPATIENT)
Dept: PULMONOLOGY | Facility: CLINIC | Age: 29
End: 2024-07-01
Payer: MEDICAID

## 2024-07-01 VITALS
BODY MASS INDEX: 32.74 KG/M2 | TEMPERATURE: 98 F | HEART RATE: 97 BPM | HEIGHT: 68 IN | WEIGHT: 216 LBS | SYSTOLIC BLOOD PRESSURE: 130 MMHG | OXYGEN SATURATION: 98 % | RESPIRATION RATE: 16 BRPM | DIASTOLIC BLOOD PRESSURE: 89 MMHG

## 2024-07-01 DIAGNOSIS — C81.90 HODGKIN LYMPHOMA, UNSPECIFIED, UNSPECIFIED SITE: ICD-10-CM

## 2024-07-01 PROCEDURE — 99213 OFFICE O/P EST LOW 20 MIN: CPT

## 2024-07-02 PROBLEM — C81.90 HODGKINS LYMPHOMA: Status: ACTIVE | Noted: 2023-11-04

## 2024-08-12 ENCOUNTER — APPOINTMENT (OUTPATIENT)
Dept: PULMONOLOGY | Facility: CLINIC | Age: 29
End: 2024-08-12
Payer: MEDICAID

## 2024-08-12 ENCOUNTER — APPOINTMENT (OUTPATIENT)
Dept: RADIOLOGY | Facility: CLINIC | Age: 29
End: 2024-08-12
Payer: MEDICAID

## 2024-08-12 VITALS
BODY MASS INDEX: 31.9 KG/M2 | TEMPERATURE: 97.2 F | HEART RATE: 140 BPM | DIASTOLIC BLOOD PRESSURE: 89 MMHG | HEIGHT: 69 IN | RESPIRATION RATE: 16 BRPM | WEIGHT: 215.4 LBS | SYSTOLIC BLOOD PRESSURE: 119 MMHG | OXYGEN SATURATION: 98 %

## 2024-08-12 DIAGNOSIS — J20.9 ACUTE BRONCHITIS, UNSPECIFIED: ICD-10-CM

## 2024-08-12 DIAGNOSIS — R05.3 CHRONIC COUGH: ICD-10-CM

## 2024-08-12 PROCEDURE — 71046 X-RAY EXAM CHEST 2 VIEWS: CPT | Mod: 26

## 2024-08-12 PROCEDURE — 93000 ELECTROCARDIOGRAM COMPLETE: CPT

## 2024-08-12 PROCEDURE — 99214 OFFICE O/P EST MOD 30 MIN: CPT | Mod: 25

## 2024-08-12 RX ORDER — AMOXICILLIN AND CLAVULANATE POTASSIUM 875; 125 MG/1; MG/1
875-125 TABLET, COATED ORAL
Qty: 10 | Refills: 0 | Status: ACTIVE | COMMUNITY
Start: 2024-08-12 | End: 1900-01-01

## 2024-08-12 NOTE — PHYSICAL EXAM
[No Acute Distress] : no acute distress [Normal Oropharynx] : normal oropharynx [Normal Appearance] : normal appearance [No Resp Distress] : no resp distress [No Abnormalities] : no abnormalities [Benign] : benign [Normal Gait] : normal gait [No Clubbing] : no clubbing [Normal Color/ Pigmentation] : normal color/ pigmentation [No Focal Deficits] : no focal deficits [Oriented x3] : oriented x3 [Normal Mood] : normal mood [Normal Affect] : normal affect [TextBox_68] : Bilateral rhonchi

## 2024-08-12 NOTE — ASSESSMENT
[FreeTextEntry1] : 29 y/o M with mediastinal adenopathy and anterior mediastinal mass underwent EBUS cryobiopsy, diagnosed with classic Hodgkins lymphoma. Patient now s/p 4 cycles of treatment. Respiratory symptoms are stable. Had echo prior to starting chemo.  Presents today after having a sick contact last week and now having increasing cough productive mucus.  Initially white but now also noted to have some brownish color.  Denies any chest pain or shortness of breath.  Has anxiety.  -We will perform a viral swab and a COVID swab to assess for infectious and viral etiology. -We will start the patient on Augmentin for 5 days twice daily for pneumonia given immunocompromise state. -Bronchodilators as needed -We will obtain a CT of the chest without contrast which was already ordered by his oncologist. -Advised to present to the emergency room for any worsening of symptoms dyspnea or dizziness is noted. -EKG in the office noted sinus tachycardia may be related to patient's anxiety or dehydration.  Advised to continue hydration.  Advised to go to the emergency room for any palpitations or chest pain or shortness of breath noted. -Advised precautions to avoid sick contacts.  And wearing a mask.  Will follow-up in 1 to 2 weeks depending upon symptom improvement.  We again reinforced the importance of going to the emergency room or the hospital if there is any worsening shortness of breath dizziness or hemoptysis noted.

## 2024-08-12 NOTE — HISTORY OF PRESENT ILLNESS
[Never] : never [TextBox_4] :  Interventional Pulmonology Consultation/Visit Note  Interventional Pulmonology Consultation/Visit Note  28-year-old M w/ recent findings of mediastinal mass with inconclusive biopsy who presented to the hospital for expedited work up. Patient underwent flexible bronchoscopy and EBUS guided core biopsy of the mediastinal node and diagnosed with lymphoma. Has been on treatment with excellent response and doing well.  Last week he had a sick contact and after which she started developing sore throat and progressively now productive mucus which was whitish in color and now is down brownish in color.  Also is very anxious and has been having cough with productive mucus which is worrying him.  Denies any fevers denies shortness of breath denies chest pain

## 2024-08-13 LAB
HMPV RNA SPEC QL NAA+PROBE: DETECTED
RAPID RVP RESULT: DETECTED
SARS-COV-2 RNA PNL RESP NAA+PROBE: NOT DETECTED

## 2024-08-14 ENCOUNTER — APPOINTMENT (OUTPATIENT)
Dept: CT IMAGING | Facility: CLINIC | Age: 29
End: 2024-08-14
Payer: MEDICAID

## 2024-08-14 PROCEDURE — 71260 CT THORAX DX C+: CPT

## 2024-08-16 RX ORDER — BENZONATATE 100 MG/1
100 CAPSULE ORAL 3 TIMES DAILY
Qty: 90 | Refills: 0 | Status: ACTIVE | COMMUNITY
Start: 2024-08-16 | End: 1900-01-01

## 2024-08-20 ENCOUNTER — NON-APPOINTMENT (OUTPATIENT)
Age: 29
End: 2024-08-20

## 2024-09-26 ENCOUNTER — APPOINTMENT (OUTPATIENT)
Dept: PEDIATRIC ADOLESCENT MEDICINE | Facility: CLINIC | Age: 29
End: 2024-09-26

## 2024-10-25 ENCOUNTER — APPOINTMENT (OUTPATIENT)
Dept: ULTRASOUND IMAGING | Facility: CLINIC | Age: 29
End: 2024-10-25
Payer: MEDICAID

## 2024-10-25 PROCEDURE — 76536 US EXAM OF HEAD AND NECK: CPT

## 2024-10-28 ENCOUNTER — APPOINTMENT (OUTPATIENT)
Dept: DERMATOLOGY | Facility: CLINIC | Age: 29
End: 2024-10-28
Payer: MEDICAID

## 2024-10-28 VITALS — WEIGHT: 220 LBS | HEIGHT: 70 IN | BODY MASS INDEX: 31.5 KG/M2

## 2024-10-28 DIAGNOSIS — L72.0 EPIDERMAL CYST: ICD-10-CM

## 2024-10-28 PROCEDURE — 99204 OFFICE O/P NEW MOD 45 MIN: CPT

## 2025-07-22 ENCOUNTER — APPOINTMENT (OUTPATIENT)
Dept: HEPATOLOGY | Facility: CLINIC | Age: 30
End: 2025-07-22

## (undated) DEVICE — SUTURE REMOVAL KIT

## (undated) DEVICE — GLV 7 PROTEXIS (WHITE)

## (undated) DEVICE — DRSG CURITY GAUZE SPONGE 4 X 4" 12-PLY

## (undated) DEVICE — STOPCOCK 4-WAY (BLUE) DISCOFIX SPIN-LOCK CONNECTOR

## (undated) DEVICE — SOL IRR POUR NS 0.9% 500ML

## (undated) DEVICE — SOL IRR NS 0.9% 250ML

## (undated) DEVICE — DRAPE 3/4 SHEET 52X76"

## (undated) DEVICE — TRAP SPECIMEN SPUTUM 40CC

## (undated) DEVICE — LABELS BLANK W PEN

## (undated) DEVICE — VENODYNE/SCD SLEEVE CALF MEDIUM

## (undated) DEVICE — SOL IRR POUR H2O 500ML

## (undated) DEVICE — BALLOON SINGLE FOR BF-UC160F

## (undated) DEVICE — GLV 8.5 PROTEXIS (WHITE)

## (undated) DEVICE — SOL ANTI FOG

## (undated) DEVICE — SYR LUER LOK 10CC

## (undated) DEVICE — DRSG TAPE TRANSPORE 1"

## (undated) DEVICE — SYR LUER SLIP TIP 30CC

## (undated) DEVICE — WARMING BLANKET LOWER ADULT

## (undated) DEVICE — NDL ASPIRATION VIZISHOT2 22G

## (undated) DEVICE — SOL INJ NS 0.9% 100ML

## (undated) DEVICE — SYR LUER LOK 20CC

## (undated) DEVICE — TUBING CANNULA SALTER LABS NASAL ADULT 7FT

## (undated) DEVICE — NDL ENDOBRONCHIAL ULTRASOUND FINE BIOPSY DEVICE 22GA

## (undated) DEVICE — VALVE SUCTION EVIS 160/200/240

## (undated) DEVICE — PACK BRONCHOSCOPY

## (undated) DEVICE — FORCEP BIOPSY 1.8MM JAW X 100CM DISP

## (undated) DEVICE — MASK SURGICAL WITH EYESHIELD ANTIFOG (ORANGE)

## (undated) DEVICE — ADAPTER FIBEROPTIC BRONCHOSCOPE DUAL AXIS SWIVEL

## (undated) DEVICE — DRAPE TOWEL BLUE 17" X 24"

## (undated) DEVICE — BRUSH CYTO DISP

## (undated) DEVICE — VALVE BIOPSY BRONCHOVIDEOSCOPE

## (undated) DEVICE — FORCEP BIOPSY BRONCHOSCOPE DISP